# Patient Record
Sex: MALE | Race: WHITE | NOT HISPANIC OR LATINO | Employment: OTHER
[De-identification: names, ages, dates, MRNs, and addresses within clinical notes are randomized per-mention and may not be internally consistent; named-entity substitution may affect disease eponyms.]

---

## 2017-06-23 ENCOUNTER — RECORDS - HEALTHEAST (OUTPATIENT)
Dept: ADMINISTRATIVE | Facility: OTHER | Age: 67
End: 2017-06-23

## 2017-10-11 ENCOUNTER — RECORDS - HEALTHEAST (OUTPATIENT)
Dept: ADMINISTRATIVE | Facility: OTHER | Age: 67
End: 2017-10-11

## 2017-10-27 ENCOUNTER — RECORDS - HEALTHEAST (OUTPATIENT)
Dept: ADMINISTRATIVE | Facility: OTHER | Age: 67
End: 2017-10-27

## 2018-07-10 ENCOUNTER — OFFICE VISIT - HEALTHEAST (OUTPATIENT)
Dept: FAMILY MEDICINE | Facility: CLINIC | Age: 68
End: 2018-07-10

## 2018-07-10 DIAGNOSIS — G47.50 PARASOMNIA, UNSPECIFIED: ICD-10-CM

## 2018-07-10 DIAGNOSIS — Z13.21 SCREENING FOR ENDOCRINE, NUTRITIONAL, METABOLIC AND IMMUNITY DISORDER: ICD-10-CM

## 2018-07-10 DIAGNOSIS — R68.82 LOW LIBIDO: ICD-10-CM

## 2018-07-10 DIAGNOSIS — H35.30 MACULAR DEGENERATION (SENILE) OF RETINA: ICD-10-CM

## 2018-07-10 DIAGNOSIS — Z13.29 SCREENING FOR ENDOCRINE, NUTRITIONAL, METABOLIC AND IMMUNITY DISORDER: ICD-10-CM

## 2018-07-10 DIAGNOSIS — Z13.0 SCREENING FOR ENDOCRINE, NUTRITIONAL, METABOLIC AND IMMUNITY DISORDER: ICD-10-CM

## 2018-07-10 DIAGNOSIS — Z13.228 SCREENING FOR ENDOCRINE, NUTRITIONAL, METABOLIC AND IMMUNITY DISORDER: ICD-10-CM

## 2018-07-10 DIAGNOSIS — Z00.01 ENCOUNTER FOR GENERAL ADULT MEDICAL EXAMINATION WITH ABNORMAL FINDINGS: ICD-10-CM

## 2018-07-10 DIAGNOSIS — Z12.5 SCREENING FOR PROSTATE CANCER: ICD-10-CM

## 2018-07-10 LAB
ALBUMIN SERPL-MCNC: 3.9 G/DL (ref 3.5–5)
ALP SERPL-CCNC: 60 U/L (ref 45–120)
ALT SERPL W P-5'-P-CCNC: 14 U/L (ref 0–45)
ANION GAP SERPL CALCULATED.3IONS-SCNC: 9 MMOL/L (ref 5–18)
AST SERPL W P-5'-P-CCNC: 20 U/L (ref 0–40)
BILIRUB SERPL-MCNC: 0.8 MG/DL (ref 0–1)
BUN SERPL-MCNC: 17 MG/DL (ref 8–22)
CALCIUM SERPL-MCNC: 9.3 MG/DL (ref 8.5–10.5)
CHLORIDE BLD-SCNC: 108 MMOL/L (ref 98–107)
CHOLEST SERPL-MCNC: 214 MG/DL
CO2 SERPL-SCNC: 26 MMOL/L (ref 22–31)
CREAT SERPL-MCNC: 0.81 MG/DL (ref 0.7–1.3)
ERYTHROCYTE [DISTWIDTH] IN BLOOD BY AUTOMATED COUNT: 12 % (ref 11–14.5)
FASTING STATUS PATIENT QL REPORTED: YES
GFR SERPL CREATININE-BSD FRML MDRD: >60 ML/MIN/1.73M2
GLUCOSE BLD-MCNC: 103 MG/DL (ref 70–125)
HCT VFR BLD AUTO: 44.3 % (ref 40–54)
HDLC SERPL-MCNC: 62 MG/DL
HGB BLD-MCNC: 15.4 G/DL (ref 14–18)
LDLC SERPL CALC-MCNC: 134 MG/DL
LH SERPL-ACNC: 3 MIU/ML
MCH RBC QN AUTO: 32.8 PG (ref 27–34)
MCHC RBC AUTO-ENTMCNC: 34.8 G/DL (ref 32–36)
MCV RBC AUTO: 94 FL (ref 80–100)
PLATELET # BLD AUTO: 157 THOU/UL (ref 140–440)
PMV BLD AUTO: 10.5 FL (ref 8.5–12.5)
POTASSIUM BLD-SCNC: 4.6 MMOL/L (ref 3.5–5)
PROT SERPL-MCNC: 6.6 G/DL (ref 6–8)
PSA SERPL-MCNC: 3.6 NG/ML (ref 0–4.5)
RBC # BLD AUTO: 4.7 MILL/UL (ref 4.4–6.2)
SODIUM SERPL-SCNC: 143 MMOL/L (ref 136–145)
TRIGL SERPL-MCNC: 88 MG/DL
TSH SERPL DL<=0.005 MIU/L-ACNC: 1.19 UIU/ML (ref 0.3–5)
WBC: 5.1 THOU/UL (ref 4–11)

## 2018-07-10 RX ORDER — CLONAZEPAM 2 MG/1
2 TABLET ORAL AT BEDTIME
Status: SHIPPED | COMMUNITY
Start: 2018-05-31

## 2018-07-10 ASSESSMENT — MIFFLIN-ST. JEOR: SCORE: 1422.37

## 2018-07-12 ENCOUNTER — COMMUNICATION - HEALTHEAST (OUTPATIENT)
Dept: FAMILY MEDICINE | Facility: CLINIC | Age: 68
End: 2018-07-12

## 2018-07-13 LAB
SHBG SERPL-SCNC: 42 NMOL/L (ref 11–80)
TESTOST FREE SERPL-MCNC: 8.86 NG/DL (ref 4.7–24.4)
TESTOST SERPL-MCNC: 493 NG/DL (ref 240–950)

## 2018-09-10 ENCOUNTER — RECORDS - HEALTHEAST (OUTPATIENT)
Dept: ADMINISTRATIVE | Facility: OTHER | Age: 68
End: 2018-09-10

## 2018-12-28 ENCOUNTER — COMMUNICATION - HEALTHEAST (OUTPATIENT)
Dept: FAMILY MEDICINE | Facility: CLINIC | Age: 68
End: 2018-12-28

## 2018-12-28 DIAGNOSIS — R06.02 SOB (SHORTNESS OF BREATH): ICD-10-CM

## 2019-01-09 ENCOUNTER — AMBULATORY - HEALTHEAST (OUTPATIENT)
Dept: PULMONOLOGY | Facility: OTHER | Age: 69
End: 2019-01-09

## 2019-01-09 DIAGNOSIS — R06.02 SOB (SHORTNESS OF BREATH): ICD-10-CM

## 2019-01-11 ENCOUNTER — COMMUNICATION - HEALTHEAST (OUTPATIENT)
Dept: PULMONOLOGY | Facility: OTHER | Age: 69
End: 2019-01-11

## 2019-01-29 ENCOUNTER — RECORDS - HEALTHEAST (OUTPATIENT)
Dept: PULMONOLOGY | Facility: OTHER | Age: 69
End: 2019-01-29

## 2019-01-29 ENCOUNTER — RECORDS - HEALTHEAST (OUTPATIENT)
Dept: ADMINISTRATIVE | Facility: OTHER | Age: 69
End: 2019-01-29

## 2019-01-29 DIAGNOSIS — R06.02 SHORTNESS OF BREATH: ICD-10-CM

## 2019-02-05 ENCOUNTER — OFFICE VISIT - HEALTHEAST (OUTPATIENT)
Dept: PULMONOLOGY | Facility: OTHER | Age: 69
End: 2019-02-05

## 2019-02-05 DIAGNOSIS — J44.9 CHRONIC OBSTRUCTIVE PULMONARY DISEASE, UNSPECIFIED COPD TYPE (H): ICD-10-CM

## 2019-02-05 DIAGNOSIS — K21.9 GASTROESOPHAGEAL REFLUX DISEASE, ESOPHAGITIS PRESENCE NOT SPECIFIED: ICD-10-CM

## 2019-02-05 DIAGNOSIS — R05.9 COUGH: ICD-10-CM

## 2019-02-05 DIAGNOSIS — Z87.891 PERSONAL HISTORY OF TOBACCO USE, PRESENTING HAZARDS TO HEALTH: ICD-10-CM

## 2019-02-05 ASSESSMENT — MIFFLIN-ST. JEOR: SCORE: 1422.37

## 2019-04-16 ENCOUNTER — OFFICE VISIT - HEALTHEAST (OUTPATIENT)
Dept: PULMONOLOGY | Facility: OTHER | Age: 69
End: 2019-04-16

## 2019-04-16 ENCOUNTER — HOSPITAL ENCOUNTER (OUTPATIENT)
Dept: CT IMAGING | Facility: HOSPITAL | Age: 69
Discharge: HOME | End: 2019-04-16
Attending: INTERNAL MEDICINE

## 2019-04-16 DIAGNOSIS — J47.9 BRONCHIECTASIS WITHOUT ACUTE EXACERBATION (H): ICD-10-CM

## 2019-04-16 DIAGNOSIS — Z87.891 PERSONAL HISTORY OF TOBACCO USE, PRESENTING HAZARDS TO HEALTH: ICD-10-CM

## 2019-04-16 LAB
IGA SERPL-MCNC: 203 MG/DL (ref 65–400)
IGA SERPL-MCNC: 867 MG/DL
IGM SERPL-MCNC: 186 MG/DL (ref 60–280)
RHEUMATOID FACT SERPL-ACNC: <15 IU/ML (ref 0–30)

## 2019-04-16 ASSESSMENT — MIFFLIN-ST. JEOR: SCORE: 1445.05

## 2019-04-18 ENCOUNTER — AMBULATORY - HEALTHEAST (OUTPATIENT)
Dept: PULMONOLOGY | Facility: OTHER | Age: 69
End: 2019-04-18

## 2019-04-19 LAB
IGG SERPL-MCNC: 875 MG/DL (ref 695–1620)
IGG1 SER-MCNC: 488 MG/DL (ref 300–856)
IGG2 SER-MCNC: 269 MG/DL (ref 158–761)
IGG3 SER-MCNC: 28 MG/DL (ref 24–192)
IGG4 SER-MCNC: 43 MG/DL (ref 11–86)

## 2019-05-07 ENCOUNTER — COMMUNICATION - HEALTHEAST (OUTPATIENT)
Dept: PULMONOLOGY | Facility: OTHER | Age: 69
End: 2019-05-07

## 2019-05-07 DIAGNOSIS — K21.9 GASTROESOPHAGEAL REFLUX DISEASE, ESOPHAGITIS PRESENCE NOT SPECIFIED: ICD-10-CM

## 2019-05-07 DIAGNOSIS — R05.9 COUGH: ICD-10-CM

## 2019-07-14 ENCOUNTER — COMMUNICATION - HEALTHEAST (OUTPATIENT)
Dept: FAMILY MEDICINE | Facility: CLINIC | Age: 69
End: 2019-07-14

## 2019-07-14 DIAGNOSIS — R06.02 SOB (SHORTNESS OF BREATH): ICD-10-CM

## 2019-08-06 ENCOUNTER — OFFICE VISIT - HEALTHEAST (OUTPATIENT)
Dept: FAMILY MEDICINE | Facility: CLINIC | Age: 69
End: 2019-08-06

## 2019-08-06 DIAGNOSIS — N52.01 ERECTILE DYSFUNCTION DUE TO ARTERIAL INSUFFICIENCY: ICD-10-CM

## 2019-08-06 DIAGNOSIS — Z00.01 ENCOUNTER FOR GENERAL ADULT MEDICAL EXAMINATION WITH ABNORMAL FINDINGS: ICD-10-CM

## 2019-08-06 DIAGNOSIS — Z13.29 SCREENING FOR ENDOCRINE, NUTRITIONAL, METABOLIC AND IMMUNITY DISORDER: ICD-10-CM

## 2019-08-06 DIAGNOSIS — R06.02 SOB (SHORTNESS OF BREATH): ICD-10-CM

## 2019-08-06 DIAGNOSIS — Z12.11 SCREEN FOR COLON CANCER: ICD-10-CM

## 2019-08-06 DIAGNOSIS — Z13.21 SCREENING FOR ENDOCRINE, NUTRITIONAL, METABOLIC AND IMMUNITY DISORDER: ICD-10-CM

## 2019-08-06 DIAGNOSIS — Z12.5 SCREENING FOR PROSTATE CANCER: ICD-10-CM

## 2019-08-06 DIAGNOSIS — R68.82 LOW LIBIDO: ICD-10-CM

## 2019-08-06 DIAGNOSIS — Z13.228 SCREENING FOR ENDOCRINE, NUTRITIONAL, METABOLIC AND IMMUNITY DISORDER: ICD-10-CM

## 2019-08-06 DIAGNOSIS — Z13.0 SCREENING FOR ENDOCRINE, NUTRITIONAL, METABOLIC AND IMMUNITY DISORDER: ICD-10-CM

## 2019-08-06 DIAGNOSIS — J44.9 CHRONIC OBSTRUCTIVE PULMONARY DISEASE, UNSPECIFIED COPD TYPE (H): ICD-10-CM

## 2019-08-06 LAB
ALBUMIN SERPL-MCNC: 4.3 G/DL (ref 3.5–5)
ALP SERPL-CCNC: 66 U/L (ref 45–120)
ALT SERPL W P-5'-P-CCNC: 17 U/L (ref 0–45)
ANION GAP SERPL CALCULATED.3IONS-SCNC: 9 MMOL/L (ref 5–18)
AST SERPL W P-5'-P-CCNC: 22 U/L (ref 0–40)
BILIRUB SERPL-MCNC: 0.5 MG/DL (ref 0–1)
BUN SERPL-MCNC: 19 MG/DL (ref 8–22)
CALCIUM SERPL-MCNC: 9.8 MG/DL (ref 8.5–10.5)
CHLORIDE BLD-SCNC: 104 MMOL/L (ref 98–107)
CHOLEST SERPL-MCNC: 209 MG/DL
CO2 SERPL-SCNC: 27 MMOL/L (ref 22–31)
CREAT SERPL-MCNC: 1.01 MG/DL (ref 0.7–1.3)
FASTING STATUS PATIENT QL REPORTED: NO
GFR SERPL CREATININE-BSD FRML MDRD: >60 ML/MIN/1.73M2
GLUCOSE BLD-MCNC: 100 MG/DL (ref 70–125)
HDLC SERPL-MCNC: 65 MG/DL
LDLC SERPL CALC-MCNC: 124 MG/DL
POTASSIUM BLD-SCNC: 5.2 MMOL/L (ref 3.5–5)
PROT SERPL-MCNC: 7.1 G/DL (ref 6–8)
PSA SERPL-MCNC: 3.5 NG/ML (ref 0–4.5)
SODIUM SERPL-SCNC: 140 MMOL/L (ref 136–145)
TRIGL SERPL-MCNC: 101 MG/DL
TSH SERPL DL<=0.005 MIU/L-ACNC: 2.03 UIU/ML (ref 0.3–5)

## 2019-08-06 RX ORDER — SILDENAFIL CITRATE 20 MG/1
TABLET ORAL
Qty: 30 TABLET | Refills: 3 | Status: SHIPPED | OUTPATIENT
Start: 2019-08-06

## 2019-08-06 ASSESSMENT — MIFFLIN-ST. JEOR: SCORE: 1392.89

## 2019-08-07 ENCOUNTER — COMMUNICATION - HEALTHEAST (OUTPATIENT)
Dept: FAMILY MEDICINE | Facility: CLINIC | Age: 69
End: 2019-08-07

## 2019-10-15 ENCOUNTER — COMMUNICATION - HEALTHEAST (OUTPATIENT)
Dept: FAMILY MEDICINE | Facility: CLINIC | Age: 69
End: 2019-10-15

## 2019-10-15 DIAGNOSIS — R06.02 SOB (SHORTNESS OF BREATH): ICD-10-CM

## 2019-10-16 ENCOUNTER — COMMUNICATION - HEALTHEAST (OUTPATIENT)
Dept: SCHEDULING | Facility: CLINIC | Age: 69
End: 2019-10-16

## 2019-10-16 ENCOUNTER — COMMUNICATION - HEALTHEAST (OUTPATIENT)
Dept: FAMILY MEDICINE | Facility: CLINIC | Age: 69
End: 2019-10-16

## 2019-10-16 DIAGNOSIS — R06.02 SOB (SHORTNESS OF BREATH): ICD-10-CM

## 2019-10-24 ENCOUNTER — COMMUNICATION - HEALTHEAST (OUTPATIENT)
Dept: FAMILY MEDICINE | Facility: CLINIC | Age: 69
End: 2019-10-24

## 2019-10-24 DIAGNOSIS — R06.02 SOB (SHORTNESS OF BREATH): ICD-10-CM

## 2019-10-25 ENCOUNTER — AMBULATORY - HEALTHEAST (OUTPATIENT)
Dept: PULMONOLOGY | Facility: OTHER | Age: 69
End: 2019-10-25

## 2019-10-25 DIAGNOSIS — K21.9 GERD (GASTROESOPHAGEAL REFLUX DISEASE): ICD-10-CM

## 2020-03-03 ENCOUNTER — COMMUNICATION - HEALTHEAST (OUTPATIENT)
Dept: FAMILY MEDICINE | Facility: CLINIC | Age: 70
End: 2020-03-03

## 2020-03-03 DIAGNOSIS — J44.9 CHRONIC OBSTRUCTIVE PULMONARY DISEASE, UNSPECIFIED COPD TYPE (H): ICD-10-CM

## 2020-04-15 ENCOUNTER — COMMUNICATION - HEALTHEAST (OUTPATIENT)
Dept: PULMONOLOGY | Facility: OTHER | Age: 70
End: 2020-04-15

## 2020-04-20 ENCOUNTER — RECORDS - HEALTHEAST (OUTPATIENT)
Dept: ADMINISTRATIVE | Facility: OTHER | Age: 70
End: 2020-04-20

## 2020-04-20 LAB — COLOGUARD-ABSTRACT: NEGATIVE

## 2020-05-06 ENCOUNTER — COMMUNICATION - HEALTHEAST (OUTPATIENT)
Dept: FAMILY MEDICINE | Facility: CLINIC | Age: 70
End: 2020-05-06

## 2020-05-12 ENCOUNTER — RECORDS - HEALTHEAST (OUTPATIENT)
Dept: HEALTH INFORMATION MANAGEMENT | Facility: CLINIC | Age: 70
End: 2020-05-12

## 2020-07-15 ENCOUNTER — COMMUNICATION - HEALTHEAST (OUTPATIENT)
Dept: PULMONOLOGY | Facility: OTHER | Age: 70
End: 2020-07-15

## 2020-09-09 ENCOUNTER — COMMUNICATION - HEALTHEAST (OUTPATIENT)
Dept: PULMONOLOGY | Facility: OTHER | Age: 70
End: 2020-09-09

## 2020-09-20 ENCOUNTER — COMMUNICATION - HEALTHEAST (OUTPATIENT)
Dept: PULMONOLOGY | Facility: OTHER | Age: 70
End: 2020-09-20

## 2020-09-20 DIAGNOSIS — K21.9 GERD (GASTROESOPHAGEAL REFLUX DISEASE): ICD-10-CM

## 2020-10-19 ENCOUNTER — COMMUNICATION - HEALTHEAST (OUTPATIENT)
Dept: FAMILY MEDICINE | Facility: CLINIC | Age: 70
End: 2020-10-19

## 2020-10-19 DIAGNOSIS — R06.02 SOB (SHORTNESS OF BREATH): ICD-10-CM

## 2020-11-05 ENCOUNTER — OFFICE VISIT - HEALTHEAST (OUTPATIENT)
Dept: FAMILY MEDICINE | Facility: CLINIC | Age: 70
End: 2020-11-05

## 2020-11-05 DIAGNOSIS — E78.5 HYPERLIPIDEMIA LDL GOAL <70: ICD-10-CM

## 2020-11-05 DIAGNOSIS — Z12.5 SCREENING PSA (PROSTATE SPECIFIC ANTIGEN): ICD-10-CM

## 2020-11-05 DIAGNOSIS — Z00.01 ENCOUNTER FOR GENERAL ADULT MEDICAL EXAMINATION WITH ABNORMAL FINDINGS: ICD-10-CM

## 2020-11-05 DIAGNOSIS — I25.10 CORONARY ARTERY CALCIFICATION: ICD-10-CM

## 2020-11-05 DIAGNOSIS — Z13.0 SCREENING FOR ENDOCRINE, NUTRITIONAL, METABOLIC AND IMMUNITY DISORDER: ICD-10-CM

## 2020-11-05 DIAGNOSIS — Z13.21 SCREENING FOR ENDOCRINE, NUTRITIONAL, METABOLIC AND IMMUNITY DISORDER: ICD-10-CM

## 2020-11-05 DIAGNOSIS — Z13.29 SCREENING FOR ENDOCRINE, NUTRITIONAL, METABOLIC AND IMMUNITY DISORDER: ICD-10-CM

## 2020-11-05 DIAGNOSIS — J44.9 CHRONIC OBSTRUCTIVE PULMONARY DISEASE, UNSPECIFIED COPD TYPE (H): ICD-10-CM

## 2020-11-05 DIAGNOSIS — R93.1 ABNORMAL FINDINGS DIAGNOSTIC IMAGING OF HEART AND CORONARY CIRCULATION: ICD-10-CM

## 2020-11-05 DIAGNOSIS — K21.9 GERD (GASTROESOPHAGEAL REFLUX DISEASE): ICD-10-CM

## 2020-11-05 DIAGNOSIS — Z11.59 ENCOUNTER FOR HCV SCREENING TEST FOR LOW RISK PATIENT: ICD-10-CM

## 2020-11-05 DIAGNOSIS — Z13.228 SCREENING FOR ENDOCRINE, NUTRITIONAL, METABOLIC AND IMMUNITY DISORDER: ICD-10-CM

## 2020-11-05 DIAGNOSIS — Z87.891 H/O NICOTINE DEPENDENCE: ICD-10-CM

## 2020-11-05 LAB
ALBUMIN SERPL-MCNC: 4 G/DL (ref 3.5–5)
ALP SERPL-CCNC: 89 U/L (ref 45–120)
ALT SERPL W P-5'-P-CCNC: 15 U/L (ref 0–45)
ANION GAP SERPL CALCULATED.3IONS-SCNC: 10 MMOL/L (ref 5–18)
AST SERPL W P-5'-P-CCNC: 20 U/L (ref 0–40)
BILIRUB SERPL-MCNC: 0.4 MG/DL (ref 0–1)
BUN SERPL-MCNC: 21 MG/DL (ref 8–28)
CALCIUM SERPL-MCNC: 9.6 MG/DL (ref 8.5–10.5)
CHLORIDE BLD-SCNC: 105 MMOL/L (ref 98–107)
CHOLEST SERPL-MCNC: 202 MG/DL
CO2 SERPL-SCNC: 26 MMOL/L (ref 22–31)
CREAT SERPL-MCNC: 0.94 MG/DL (ref 0.7–1.3)
ERYTHROCYTE [DISTWIDTH] IN BLOOD BY AUTOMATED COUNT: 12.2 % (ref 11–14.5)
FASTING STATUS PATIENT QL REPORTED: NO
GFR SERPL CREATININE-BSD FRML MDRD: >60 ML/MIN/1.73M2
GLUCOSE BLD-MCNC: 110 MG/DL (ref 70–125)
HCT VFR BLD AUTO: 44.6 % (ref 40–54)
HCV AB SERPL QL IA: NEGATIVE
HDLC SERPL-MCNC: 57 MG/DL
HGB BLD-MCNC: 14.9 G/DL (ref 14–18)
LDLC SERPL CALC-MCNC: 126 MG/DL
MCH RBC QN AUTO: 31.2 PG (ref 27–34)
MCHC RBC AUTO-ENTMCNC: 33.4 G/DL (ref 32–36)
MCV RBC AUTO: 93 FL (ref 80–100)
PLATELET # BLD AUTO: 215 THOU/UL (ref 140–440)
PMV BLD AUTO: 8.1 FL (ref 7–10)
POTASSIUM BLD-SCNC: 5.3 MMOL/L (ref 3.5–5)
PROT SERPL-MCNC: 7 G/DL (ref 6–8)
PSA SERPL-MCNC: 3.5 NG/ML (ref 0–6.5)
RBC # BLD AUTO: 4.78 MILL/UL (ref 4.4–6.2)
SODIUM SERPL-SCNC: 141 MMOL/L (ref 136–145)
TRIGL SERPL-MCNC: 96 MG/DL
TSH SERPL DL<=0.005 MIU/L-ACNC: 0.99 UIU/ML (ref 0.3–5)
WBC: 5.2 THOU/UL (ref 4–11)

## 2020-11-05 RX ORDER — ATORVASTATIN CALCIUM 10 MG/1
10 TABLET, FILM COATED ORAL DAILY
Qty: 90 TABLET | Refills: 3 | Status: SHIPPED | OUTPATIENT
Start: 2020-11-05

## 2020-11-05 RX ORDER — FAMOTIDINE 40 MG/1
40 TABLET, FILM COATED ORAL DAILY
Qty: 90 TABLET | Refills: 3 | Status: SHIPPED | OUTPATIENT
Start: 2020-11-05 | End: 2021-11-11

## 2020-11-05 ASSESSMENT — MIFFLIN-ST. JEOR: SCORE: 1394.03

## 2020-11-05 ASSESSMENT — ANXIETY QUESTIONNAIRES
1. FEELING NERVOUS, ANXIOUS, OR ON EDGE: NOT AT ALL
2. NOT BEING ABLE TO STOP OR CONTROL WORRYING: NOT AT ALL

## 2020-11-06 ENCOUNTER — COMMUNICATION - HEALTHEAST (OUTPATIENT)
Dept: FAMILY MEDICINE | Facility: CLINIC | Age: 70
End: 2020-11-06

## 2020-11-16 ENCOUNTER — COMMUNICATION - HEALTHEAST (OUTPATIENT)
Dept: FAMILY MEDICINE | Facility: CLINIC | Age: 70
End: 2020-11-16

## 2020-11-16 DIAGNOSIS — J44.9 CHRONIC OBSTRUCTIVE PULMONARY DISEASE, UNSPECIFIED COPD TYPE (H): ICD-10-CM

## 2020-11-29 ENCOUNTER — HOSPITAL ENCOUNTER (OUTPATIENT)
Dept: CT IMAGING | Facility: HOSPITAL | Age: 70
Discharge: HOME | End: 2020-11-29
Attending: FAMILY MEDICINE

## 2020-11-29 DIAGNOSIS — Z87.891 H/O NICOTINE DEPENDENCE: ICD-10-CM

## 2020-11-29 DIAGNOSIS — J44.9 CHRONIC OBSTRUCTIVE PULMONARY DISEASE, UNSPECIFIED COPD TYPE (H): ICD-10-CM

## 2020-12-01 ENCOUNTER — RECORDS - HEALTHEAST (OUTPATIENT)
Dept: ADMINISTRATIVE | Facility: OTHER | Age: 70
End: 2020-12-01

## 2020-12-01 ENCOUNTER — COMMUNICATION - HEALTHEAST (OUTPATIENT)
Dept: SCHEDULING | Facility: CLINIC | Age: 70
End: 2020-12-01

## 2020-12-01 DIAGNOSIS — M25.559 HIP PAIN: ICD-10-CM

## 2020-12-01 ASSESSMENT — MIFFLIN-ST. JEOR: SCORE: 1382.97

## 2020-12-02 ENCOUNTER — COMMUNICATION - HEALTHEAST (OUTPATIENT)
Dept: SCHEDULING | Facility: CLINIC | Age: 70
End: 2020-12-02

## 2020-12-02 ENCOUNTER — ANESTHESIA - HEALTHEAST (OUTPATIENT)
Dept: SURGERY | Facility: CLINIC | Age: 70
End: 2020-12-02

## 2020-12-02 ENCOUNTER — SURGERY - HEALTHEAST (OUTPATIENT)
Dept: SURGERY | Facility: CLINIC | Age: 70
End: 2020-12-02

## 2020-12-03 ENCOUNTER — COMMUNICATION - HEALTHEAST (OUTPATIENT)
Dept: FAMILY MEDICINE | Facility: CLINIC | Age: 70
End: 2020-12-03

## 2020-12-04 ENCOUNTER — HOME CARE/HOSPICE - HEALTHEAST (OUTPATIENT)
Dept: HOME HEALTH SERVICES | Facility: HOME HEALTH | Age: 70
End: 2020-12-04

## 2020-12-04 ASSESSMENT — MIFFLIN-ST. JEOR: SCORE: 1427.13

## 2020-12-06 ENCOUNTER — HOME CARE/HOSPICE - HEALTHEAST (OUTPATIENT)
Dept: HOME HEALTH SERVICES | Facility: HOME HEALTH | Age: 70
End: 2020-12-06

## 2020-12-06 ENCOUNTER — COMMUNICATION - HEALTHEAST (OUTPATIENT)
Dept: HOME HEALTH SERVICES | Facility: HOME HEALTH | Age: 70
End: 2020-12-06

## 2020-12-08 ENCOUNTER — HOME CARE/HOSPICE - HEALTHEAST (OUTPATIENT)
Dept: HOME HEALTH SERVICES | Facility: HOME HEALTH | Age: 70
End: 2020-12-08

## 2020-12-09 ENCOUNTER — COMMUNICATION - HEALTHEAST (OUTPATIENT)
Dept: HOME HEALTH SERVICES | Facility: HOME HEALTH | Age: 70
End: 2020-12-09

## 2020-12-09 ENCOUNTER — HOME CARE/HOSPICE - HEALTHEAST (OUTPATIENT)
Dept: HOME HEALTH SERVICES | Facility: HOME HEALTH | Age: 70
End: 2020-12-09

## 2020-12-09 ENCOUNTER — COMMUNICATION - HEALTHEAST (OUTPATIENT)
Dept: FAMILY MEDICINE | Facility: CLINIC | Age: 70
End: 2020-12-09

## 2020-12-10 ENCOUNTER — HOME CARE/HOSPICE - HEALTHEAST (OUTPATIENT)
Dept: HOME HEALTH SERVICES | Facility: HOME HEALTH | Age: 70
End: 2020-12-10

## 2020-12-10 ENCOUNTER — COMMUNICATION - HEALTHEAST (OUTPATIENT)
Dept: TELEHEALTH | Facility: CLINIC | Age: 70
End: 2020-12-10

## 2020-12-10 ENCOUNTER — OFFICE VISIT - HEALTHEAST (OUTPATIENT)
Dept: FAMILY MEDICINE | Facility: CLINIC | Age: 70
End: 2020-12-10

## 2020-12-10 ENCOUNTER — COMMUNICATION - HEALTHEAST (OUTPATIENT)
Dept: FAMILY MEDICINE | Facility: CLINIC | Age: 70
End: 2020-12-10

## 2020-12-10 DIAGNOSIS — R41.3 EPISODIC MEMORY LOSS: ICD-10-CM

## 2020-12-10 DIAGNOSIS — S72.001A CLOSED FRACTURE OF NECK OF RIGHT FEMUR, INITIAL ENCOUNTER (H): ICD-10-CM

## 2020-12-10 DIAGNOSIS — Z09 HOSPITAL DISCHARGE FOLLOW-UP: ICD-10-CM

## 2020-12-10 DIAGNOSIS — J44.9 CHRONIC OBSTRUCTIVE PULMONARY DISEASE, UNSPECIFIED COPD TYPE (H): ICD-10-CM

## 2020-12-11 ENCOUNTER — HOME CARE/HOSPICE - HEALTHEAST (OUTPATIENT)
Dept: HOME HEALTH SERVICES | Facility: HOME HEALTH | Age: 70
End: 2020-12-11

## 2020-12-14 ENCOUNTER — HOME CARE/HOSPICE - HEALTHEAST (OUTPATIENT)
Dept: HOME HEALTH SERVICES | Facility: HOME HEALTH | Age: 70
End: 2020-12-14

## 2020-12-16 ENCOUNTER — HOME CARE/HOSPICE - HEALTHEAST (OUTPATIENT)
Dept: HOME HEALTH SERVICES | Facility: HOME HEALTH | Age: 70
End: 2020-12-16

## 2020-12-16 ENCOUNTER — COMMUNICATION - HEALTHEAST (OUTPATIENT)
Dept: FAMILY MEDICINE | Facility: CLINIC | Age: 70
End: 2020-12-16

## 2020-12-17 ENCOUNTER — HOME CARE/HOSPICE - HEALTHEAST (OUTPATIENT)
Dept: HOME HEALTH SERVICES | Facility: HOME HEALTH | Age: 70
End: 2020-12-17

## 2020-12-21 ENCOUNTER — HOME CARE/HOSPICE - HEALTHEAST (OUTPATIENT)
Dept: HOME HEALTH SERVICES | Facility: HOME HEALTH | Age: 70
End: 2020-12-21

## 2020-12-23 ENCOUNTER — HOME CARE/HOSPICE - HEALTHEAST (OUTPATIENT)
Dept: HOME HEALTH SERVICES | Facility: HOME HEALTH | Age: 70
End: 2020-12-23

## 2021-03-16 ENCOUNTER — COMMUNICATION - HEALTHEAST (OUTPATIENT)
Dept: FAMILY MEDICINE | Facility: CLINIC | Age: 71
End: 2021-03-16

## 2021-03-16 DIAGNOSIS — K21.9 GERD (GASTROESOPHAGEAL REFLUX DISEASE): ICD-10-CM

## 2021-03-19 ENCOUNTER — RECORDS - HEALTHEAST (OUTPATIENT)
Dept: ADMINISTRATIVE | Facility: OTHER | Age: 71
End: 2021-03-19

## 2021-04-20 ENCOUNTER — COMMUNICATION - HEALTHEAST (OUTPATIENT)
Dept: FAMILY MEDICINE | Facility: CLINIC | Age: 71
End: 2021-04-20

## 2021-04-20 ENCOUNTER — RECORDS - HEALTHEAST (OUTPATIENT)
Dept: ADMINISTRATIVE | Facility: OTHER | Age: 71
End: 2021-04-20

## 2021-04-20 DIAGNOSIS — J44.9 CHRONIC OBSTRUCTIVE PULMONARY DISEASE, UNSPECIFIED COPD TYPE (H): ICD-10-CM

## 2021-04-21 RX ORDER — BUDESONIDE AND FORMOTEROL FUMARATE DIHYDRATE 80; 4.5 UG/1; UG/1
2 AEROSOL RESPIRATORY (INHALATION) 2 TIMES DAILY
Qty: 1 INHALER | Refills: 5 | Status: SHIPPED | OUTPATIENT
Start: 2021-04-21 | End: 2023-01-05

## 2021-05-26 VITALS
OXYGEN SATURATION: 94 % | TEMPERATURE: 98.2 F | SYSTOLIC BLOOD PRESSURE: 148 MMHG | DIASTOLIC BLOOD PRESSURE: 56 MMHG | HEART RATE: 83 BPM

## 2021-05-26 VITALS — HEART RATE: 84 BPM | OXYGEN SATURATION: 97 %

## 2021-05-27 VITALS — HEART RATE: 81 BPM | OXYGEN SATURATION: 98 %

## 2021-05-27 VITALS
TEMPERATURE: 100.5 F | DIASTOLIC BLOOD PRESSURE: 83 MMHG | HEART RATE: 88 BPM | OXYGEN SATURATION: 94 % | SYSTOLIC BLOOD PRESSURE: 140 MMHG

## 2021-05-27 VITALS — HEART RATE: 79 BPM | OXYGEN SATURATION: 98 %

## 2021-05-27 NOTE — PATIENT INSTRUCTIONS - HE
Only use albuterol if & when you feel shortness of breath     Can try stopping the Symbicort, but resume it two times a day if shortness of breath recurs    Lab tests today for bronchiectasis workup    If cough recurs, collect in specimen cup and send for culture    Elevate back, continue antacid    Return to clinic in April 2020 for lung cancer screening, or sooner if new questions, symptoms, or concerning test results arise

## 2021-05-27 NOTE — PROGRESS NOTES
"We got a call today from the lab saying that Bill's hemogram clotted and will need to be redrawn.  I sent a message to Dr. Jason asking if he wanted him to come back in to get redrawn right away? Or can he wait until his next apt with you or his PCP? Dr. Gray responded saying \"He really wants to avoid coming back more than he has to, and it's unlikely to find a cause of his bronchiectasis, so I think we can just wait until he next clinic visit next year\"  "

## 2021-05-27 NOTE — PROGRESS NOTES
Assessment and Plan:You Montano is a 68 y.o. M with a past medical history significant for a 40-pack-year smoking history, having quit 5-10 years ago, who presented to clinic in Feb 2019 for shortness of breath and a cough productive for whitish sputum primarily in the mornings.  He reported occasional heartburn and sleeps flat at night.  He has used a CPAP from [facility] for sleep apnea for the past 15 years and recently had it checked and adjusted.  Until coming to our clinic he had never previously had pulmonary function testing or been formally diagnosed with COPD.  He had been recently started a on a as needed albuterol inhaler with a spacer that he uses on average 1-2 times per week and experienced benefit from it.     1/29/2019 PFTs: Consistent with moderate obstruction, FEV1 40% of predicted.  Diffusing capacity was decreased at 47% of predicted.  RV was 172% of predicted showing air trapping    4/16/2019 low-dose noncontrast chest CT: No nodules concerning for malignancy.  Mild cylindrical bronchiectasis in the lower lobes with slight atelectasis was seen.    On 4/16/2019 follow-up visit, the patient states that his morning cough and shortness of breath have completely resolved since starting to take the antacid.  He tried a wedge pillow felt comfortable with that, and was not able to elevate his head of bed with blocks due to the design that would cause it to break the bed.  He is no longer having shortness of breath.  He is using Symbicort along with albuterol twice daily whether he feels short of breath or not.  He only occasionally needs to use the albuterol at other times on top of that.     1) Nocturnal reflux aspiration (likely causing morning cough and shortness of breath), continue:  - Zantac nightly  - avoid food or drink within 3 hours of bedtime  - avoid trigger foods (alcohol, caffeine, spicy foods)  - Patient states he will try to elevate his back by placing a wedge pillow under the  mattress     2) Moderate COPD: Shortness of breath resolved since last visit when we started him on Symbicort, however he is also using the albuterol twice a day whether he feels short of breath or not.  He is questioning whether he even needs his Symbicort now.  - Patient advised to use albuterol as a rescue inhaler only when he feels short of breath  - May try stopping Symbicort, however resume if shortness of breath episodes increase significantly  - Patient does not feel the need for pulmonary rehab as he is able to exercise by walking a mile and a half every other day without difficulty     3) bronchiectasis-new diagnosis per CT scan, images reviewed with the patient: Likely due to chronic reflux aspiration given the bibasilar gravity dependent location  - We will send connective tissue disease workup labs today  - Patient not currently having a cough, however was provided with a sterile specimen cup to submit a sputum culture if his cough recurs  - No current symptoms of shortness of breath, congestion, or cough so will hold off on flutter valve therapy at this time.  May consider if symptoms worsen.    4) next lung cancer screening CT due April 2020     Patient wishes to wait until his next lung cancer screening CT is due for clinic follow-up, unless new or worsening symptoms are concerning lab results appear in the interim        CCx: Lung cancer screening, COPD, dyspnea    HPI: Mr Montano states that his morning cough and shortness of breath have completely resolved since starting to take the antacid.  He tried a wedge pillow felt comfortable with that, and was not able to elevate his head of bed with blocks due to the design that would cause it to break the bed.  He is no longer having shortness of breath.  He is using Symbicort along with albuterol twice daily whether he feels short of breath or not.  He only occasionally needs to use the albuterol at other times on top of that.    ROS:  A review of 12  organ systems was performed with pertinent positives and negatives noted in the HPI.      Current Meds:  Current Outpatient Medications   Medication Sig Note     albuterol (PROAIR HFA;PROVENTIL HFA;VENTOLIN HFA) 90 mcg/actuation inhaler Inhale 2 puffs every 6 (six) hours as needed for wheezing.      budesonide-formoterol (SYMBICORT) 80-4.5 mcg/actuation inhaler Inhale 2 puffs 2 (two) times a day.      clonazePAM (KLONOPIN) 2 MG tablet Take 2 mg by mouth. 7/10/2018: [email or web address] Sig: Take 1 tablet by mouth at bedtime.     ranitidine (ZANTAC) 150 MG tablet Take 1 tablet (150 mg total) by mouth at bedtime.      vitamins  A,C,E-zinc-copper 14,320-226-200 unit-mg-unit cap 2 caps daily        Labs:  No results found for this or any previous visit (from the past 72 hour(s)).    I have personally reviewed all pertinent imaging studies and PFT results unless otherwise noted.    Imaging studies:  Ct Low Dose Lung Screening Chest    Result Date: 4/16/2019  EXAM: LOW DOSE LUNG CANCER SCREENING CT CHEST LOCATION: [facility] DATE/TIME: 4/16/2019 10:45 AM INDICATION: Lung cancer screening. High risk patient with greater than 30 pack year smoking history. COMPARISON: None. TECHNIQUE: Low-dose lung cancer screening noncontrast CT chest. Dose reduction techniques were used. FINDINGS: LUNGS AND PLEURA: Negative screening CT chest. Mild cylindrical bronchiectasis in the lower lobes and some minimal atelectasis in the lower lobes posteriorly. Lungs otherwise clear. No infiltrates. No mucous plugging. A few benign calcified granulomas in the both lungs with some benign calcified right hilar lymph nodes. MEDIASTINUM: Negative. No lymphadenopathy. CORONARY ARTERY CALCIFICATION: Severe. LIMITED UPPER ABDOMEN: Benign-appearing liver lesion left lobe likely cyst. MUSCULOSKELETAL: Negative.     CONCLUSION: 1.  Negative screening CT chest. 2.  Mild cylindrical bronchiectasis in  "the lower lobes with slight atelectasis. 3.  Benign calcified granulomas. 4.  Severe coronary artery calcifications. RADIOLOGIST RECOMMENDATION: Recommend annual low-dose lung cancer screening CT if clinically appropriate. LungRADS CATEGORY: 2S: Benign. SIGNIFICANT INCIDENTAL FINDINGS: Positive. Severe coronary artery calcifications.        Physical Exam:  /82   Pulse 70   Resp 12   Ht 5' 6.5\" (1.689 m)   Wt 163 lb (73.9 kg)   SpO2 96%   BMI 25.91 kg/m    General - Well nourished  Ears/Mouth -  OP pink moist, no thrush  Neck - no cervical lymphadenopathy  Lungs - Clear to ausculation bilaterally, no crackles or wheezes  CVS - regular rhythm with no murmurs, rubs or gallups  Abdomen - soft, NT, ND, NABS  Ext - no cyanosis, clubbing or edema  Skin - no rash  Psychology - alert and oriented, answers appropriate        Electronically signed by:    Oliver Gray MD  [facility]  "

## 2021-05-30 NOTE — TELEPHONE ENCOUNTER
RN cannot approve Refill Request    RN can NOT refill this medication Protocol failed and NO refill given.       Allison Doty, [facility] [place] [facility] Triage/Med Refill 7/14/2019    Requested Prescriptions   Pending Prescriptions Disp Refills     VENTOLIN HFA 90 mcg/actuation inhaler [Pharmacy Med Name: VENTOLIN HFA INH W/DOS CTR 200PUFFS] 18 g 0     Sig: INHALE 2 PUFFS BY MOUTH EVERY 6 HOURS AS NEEDED FOR WHEEZING       Albuterol/Levalbuterol Refill Protocol Failed - 7/14/2019  1:32 PM        Failed - PCP or prescribing provider visit in last year     Last office visit with prescriber/PCP: Visit date not found OR same dept: Visit date not found OR same specialty: Visit date not found Last physical: 7/10/2018       Next appt within 3 mo: Visit date not found  Next physical within 3 mo: Visit date not found  Prescriber OR PCP: Vincenzo Malik MD  Last diagnosis associated with med order: There are no diagnoses linked to this encounter.  If protocol passes may refill for 6 months if within 3 months of last provider visit (or a total of 9 months). If patient requesting >1 inhaler per month refill x 6 months and have patient make appointment with provider.

## 2021-05-31 NOTE — PROGRESS NOTES
Assessment and Plan:     1. Encounter for general adult medical examination with abnormal findings      2. SOB (shortness of breath)    - albuterol (VENTOLIN HFA) 90 mcg/actuation inhaler; INHALE 2 PUFFS BY MOUTH EVERY 6 HOURS AS NEEDED FOR WHEEZING  Dispense: 18 g; Refill: 3    3. Chronic obstructive pulmonary disease, unspecified COPD type (H)    - budesonide-formoterol (SYMBICORT) 80-4.5 mcg/actuation inhaler; Inhale 2 puffs 2 (two) times a day.  Dispense: 1 Inhaler; Refill: 12    4. Screening for endocrine, nutritional, metabolic and immunity disorder    - Comprehensive Metabolic Panel  - Lipid Cascade  - Thyroid Cascade    5. Low libido    - sildenafil (REVATIO) 20 mg tablet; Take 2-3 tabs PO 1 hr before sex  Dispense: 30 tablet; Refill: 3    6. Erectile dysfunction due to arterial insufficiency    - sildenafil (REVATIO) 20 mg tablet; Take 2-3 tabs PO 1 hr before sex  Dispense: 30 tablet; Refill: 3    7. Screening for prostate cancer    - PSA (Prostatic-Specific Antigen), Annual Screen    8. Screen for colon cancer    - Cologuard     The patient's current medical problems were reviewed.  Patient continue to follow with ophthalmologist for macular degeneration treatment.  I have had an Advance Directives discussion with the patient.  The following high BMI interventions were performed this visit: encouragement to exercise  The following health maintenance schedule was reviewed with the patient and provided in printed form in the after visit summary:   Health Maintenance   Topic Date Due     HEPATITIS C SCREENING  1950     ZOSTER VACCINES (1 of 2) 08/27/1999     COLOGUARD  08/27/1999     INFLUENZA VACCINE RULE BASED (1) 08/01/2019     MEDICARE ANNUAL WELLNESS VISIT  08/06/2020     FALL RISK ASSESSMENT  08/06/2020     TD 18+ HE  02/28/2022     ADVANCE CARE PLANNING  08/06/2024     PNEUMOCOCCAL POLYSACCHARIDE VACCINE AGE 65 AND OVER  Completed     PNEUMOCOCCAL CONJUGATE VACCINE FOR ADULTS (PCV13 OR PREVNAR)   Completed        Subjective:   Chief Complaint: You Montano is an 68 y.o. male here for an Annual Wellness visit.   HPI: Ocular degeneration, follow-up with ophthalmologist, insomnia, managed with Klonopin, has been seen neurologist.    Review of Systems:    Please see above.  The rest of the review of systems are negative for all systems.    Patient Care Team:  Vincenzo Malik MD as PCP - General (Family Medicine)     Patient Active Problem List   Diagnosis     Parasomnia, unspecified     Low libido     Macular degeneration (senile) of retina     No past medical history on file.   No past surgical history on file.   No family history on file.   Social History     Socioeconomic History     Marital status:      Spouse name: Not on file     Number of children: Not on file     Years of education: Not on file     Highest education level: Not on file   Occupational History     Not on file   Social Needs     Financial resource strain: Not on file     Food insecurity:     Worry: Not on file     Inability: Not on file     Transportation needs:     Medical: Not on file     Non-medical: Not on file   Tobacco Use     Smoking status: Former Smoker     Smokeless tobacco: Never Used   Substance and Sexual Activity     Alcohol use: Not on file     Drug use: Not on file     Sexual activity: Not on file   Lifestyle     Physical activity:     Days per week: Not on file     Minutes per session: Not on file     Stress: Not on file   Relationships     Social connections:     Talks on phone: Not on file     Gets together: Not on file     Attends Congregation service: Not on file     Active member of club or organization: Not on file     Attends meetings of clubs or organizations: Not on file     Relationship status: Not on file     Intimate partner violence:     Fear of current or ex partner: Not on file     Emotionally abused: Not on file     Physically abused: Not on file     Forced sexual activity: Not on file  "  Other Topics Concern     Not on file   Social History Narrative     Not on file      Current Outpatient Medications   Medication Sig Dispense Refill     albuterol (VENTOLIN HFA) 90 mcg/actuation inhaler INHALE 2 PUFFS BY MOUTH EVERY 6 HOURS AS NEEDED FOR WHEEZING 18 g 3     budesonide-formoterol (SYMBICORT) 80-4.5 mcg/actuation inhaler Inhale 2 puffs 2 (two) times a day. 1 Inhaler 12     clonazePAM (KLONOPIN) 2 MG tablet Take 2 mg by mouth.       ranitidine (ZANTAC) 150 MG tablet TAKE 1 TABLET(150 MG) BY MOUTH AT BEDTIME 90 tablet 11     sildenafil (REVATIO) 20 mg tablet Take 2-3 tabs PO 1 hr before sex 30 tablet 3     No current facility-administered medications for this visit.       Objective:   Vital Signs:   Visit Vitals  /55 (Patient Site: Right Arm, Patient Position: Sitting, Cuff Size: Adult Regular)   Pulse 71   Ht 5' 5.5\" (1.664 m)   Wt 155 lb (70.3 kg)   SpO2 95%   BMI 25.40 kg/m         VisionScreening:  No exam data present     PHYSICAL EXAM  Physical Examination: General appearance - alert, well appearing, and in no distress  Mental status - alert, oriented to person, place, and time  Eyes - pupils equal and reactive, extraocular eye movements intact  Ears - bilateral TM's and external ear canals normal  Nose - normal and patent, no erythema, discharge or polyps  Mouth - mucous membranes moist, pharynx normal without lesions  Neck - supple, no significant adenopathy  Lymphatics - no palpable lymphadenopathy, no hepatosplenomegaly  Chest - clear to auscultation, no wheezes, rales or rhonchi, symmetric air entry  Heart - normal rate, regular rhythm, normal S1, S2, no murmurs, rubs, clicks or gallops  Abdomen - soft, nontender, nondistended, no masses or organomegaly   Male - no penile lesions or discharge, no testicular masses or tenderness, no hernias  Rectal - negative without mass, lesions or tenderness  Back exam - full range of motion, no tenderness, palpable spasm or pain on " motion  Neurological - alert, oriented, normal speech, no focal findings or movement disorder noted  Musculoskeletal - no joint tenderness, deformity or swelling  Extremities - peripheral pulses normal, no pedal edema, no clubbing or cyanosis  Skin - normal coloration and turgor, no rashes, no suspicious skin lesions noted    Assessment Results 8/6/2019   Activities of Daily Living No help needed   Instrumental Activities of Daily Living No help needed   Mini Cog Total Score 3   Some recent data might be hidden     A Mini-Cog score of 0-2 suggests the possibility of dementia, score of 3-5 suggests no dementia    Identified Health Risks:     The patient reports that he drinks more than one alcoholic drink per day but denies binge or excessive drinking. He was counseled and given information about possible harmful effects of excessive alcohol intake.  The patient was counseled and encouraged to consider modifying their diet and eating habits. He was provided with information on recommended healthy diet options.  The patient was provided with written information regarding signs of hearing loss.  Patient's advanced directive was discussed and I have discussed my concerns regarding the patient's wishes.  Honoring  choice form given to patient complete and bring back.

## 2021-06-01 VITALS — HEIGHT: 67 IN | BODY MASS INDEX: 24.8 KG/M2 | WEIGHT: 158 LBS

## 2021-06-02 VITALS — BODY MASS INDEX: 24.8 KG/M2 | HEIGHT: 67 IN | WEIGHT: 158 LBS

## 2021-06-02 NOTE — TELEPHONE ENCOUNTER
Refill Approved    Rx renewed per Medication Renewal Policy. Medication was last renewed on 08/06/2019.    Last office visit: 08/06/2019 w/ Dr. Malik PCP     Ligia Garduno, [facility] Triage/Med Refill 10/15/2019     Requested Prescriptions   Pending Prescriptions Disp Refills     albuterol (VENTOLIN HFA) 90 mcg/actuation inhaler 18 g 3     Sig: INHALE 2 PUFFS BY MOUTH EVERY 6 HOURS AS NEEDED FOR WHEEZING       Albuterol/Levalbuterol Refill Protocol Passed - 10/15/2019  3:13 PM        Passed - PCP or prescribing provider visit in last year     Last office visit with prescriber/PCP: Visit date not found OR same dept: Visit date not found OR same specialty: Visit date not found Last physical: 8/6/2019       Next appt within 3 mo: Visit date not found  Next physical within 3 mo: Visit date not found  Prescriber OR PCP: Vincenzo Malik MD  Last diagnosis associated with med order: 1. SOB (shortness of breath)  - albuterol (VENTOLIN HFA) 90 mcg/actuation inhaler; INHALE 2 PUFFS BY MOUTH EVERY 6 HOURS AS NEEDED FOR WHEEZING  Dispense: 18 g; Refill: 3    If protocol passes may refill for 6 months if within 3 months of last provider visit (or a total of 9 months). If patient requesting >1 inhaler per month refill x 6 months and have patient make appointment with provider.

## 2021-06-02 NOTE — TELEPHONE ENCOUNTER
Medication Request  Medication name:   )    Associated Diagnoses     SOB (shortness of breath)       Pharmacy     [id number] - [place], [place] - [address] AT [facility]   Additional Information     Associated Reports   View Encounter   Priority and Order Details       Pharmacy Name and Location: [facility]   Reason for request: New prescription to this pharmacy  When did you use medication last?:  unknown  Patient offered appointment:  NA  Okay to leave a detailed message: yes  [place] :[phone]

## 2021-06-02 NOTE — TELEPHONE ENCOUNTER
FYI - Status Update  Who is Calling: Pharmacy  Update: Please resend to mail order pharmacy.   Okay to leave a detailed message?:  Yes

## 2021-06-02 NOTE — TELEPHONE ENCOUNTER
Per pharmacy; patient requesting 90 day supply.    Refill Request  Did you contact pharmacy: Request received from patient's pharmacy via fax.   Medication name:   Requested Prescriptions     Pending Prescriptions Disp Refills     albuterol (VENTOLIN HFA) 90 mcg/actuation inhaler 18 g 3     Sig: INHALE 2 PUFFS BY MOUTH EVERY 6 HOURS AS NEEDED FOR WHEEZING     Who prescribed the medication: Vincenzo Malik MD   Pharmacy Name and Location: [facility])  Is patient out of medication: Information not provided.   Patient notified refills processed in 72 hours:  no  Okay to leave a detailed message: no

## 2021-06-03 VITALS — BODY MASS INDEX: 24.91 KG/M2 | WEIGHT: 155 LBS | HEIGHT: 66 IN

## 2021-06-03 VITALS — HEIGHT: 67 IN | BODY MASS INDEX: 25.58 KG/M2 | WEIGHT: 163 LBS

## 2021-06-05 VITALS
SYSTOLIC BLOOD PRESSURE: 153 MMHG | BODY MASS INDEX: 26.16 KG/M2 | HEART RATE: 82 BPM | HEIGHT: 65 IN | WEIGHT: 157 LBS | OXYGEN SATURATION: 99 % | DIASTOLIC BLOOD PRESSURE: 69 MMHG | RESPIRATION RATE: 16 BRPM

## 2021-06-05 VITALS — BODY MASS INDEX: 25.84 KG/M2 | HEIGHT: 66 IN | WEIGHT: 160.8 LBS

## 2021-06-06 NOTE — TELEPHONE ENCOUNTER
Medication Question or Clarification  Who is calling: [facility]  What medication are you calling about (include dose and sig)?: Symbicort 80-4.5 mcg/actuation inhaler, 2 puffs two times a day   Who prescribed the medication?: Vincenzo Malik MD   What is your question/concern?: Patient would like a 90 day supply  Requested Pharmacy: [facility]  Okay to leave a detailed message?: No

## 2021-06-08 NOTE — TELEPHONE ENCOUNTER
Left detailed message on patient's personal VM, of cologuard results from 4/21/20. Results: Negative; patient is good for 3 years till next Cologuard check. If additional questions, please call back to clinic.

## 2021-06-12 NOTE — TELEPHONE ENCOUNTER
Who is calling:  Patient  Reason for Call:    Patient is requesting status of medication.  Patient is out of medication.  Patient states if he needs a virtual appointment, let him know.  Thank you.  Date of last appointment with primary care: 8/16/2019  Okay to leave a detailed message: Yes

## 2021-06-12 NOTE — PROGRESS NOTES
Assessment and Plan:     Patient has been advised of split billing requirements and indicates understanding: No  1. Encounter for general adult medical examination with abnormal findings  Patient [employer] insurance is asking him to see a new PCP looking for 1.    2. GERD (gastroesophageal reflux disease)    - famotidine (PEPCID) 40 MG tablet; Take 1 tablet (40 mg total) by mouth daily.  Dispense: 90 tablet; Refill: 3  - HM2(CBC w/o Differential)  - Comprehensive Metabolic Panel  - Lipid Cascade    3. Chronic obstructive pulmonary disease, unspecified COPD type (H)    - CT Low Dose Lung Screening Chest; Future    4. Screening for endocrine, nutritional, metabolic and immunity disorder    - HM2(CBC w/o Differential)  - Thyroid [place]    5. Encounter for HCV screening test for low risk patient    - Hepatitis C Antibody (Anti-HCV)    6. Screening PSA (prostate specific antigen)    - PSA (Prostatic-Specific Antigen), Annual Screen    7. Abnormal findings on diagnostic imaging of other parts of digestive tract     - Lipid Cascade    8. H/O nicotine dependence  - CT Low Dose Lung Screening Chest; Future    9. Coronary artery calcification  Cholesterol-lowering medication discussed, with possible side effect Lipitor 10 mg prescribed.  - atorvastatin (LIPITOR) 10 MG tablet; Take 1 tablet (10 mg total) by mouth daily.  Dispense: 90 tablet; Refill: 3    10. Hyperlipidemia LDL goal <70  - atorvastatin (LIPITOR) 10 MG tablet; Take 1 tablet (10 mg total) by mouth daily.  Dispense: 90 tablet; Refill: 3     The patient's current medical problems were reviewed.    I have had an Advance Directives discussion with the patient.  The following high BMI interventions were performed this visit: encouragement to exercise  The following health maintenance schedule was reviewed with the patient and provided in printed form in the after visit summary:   Health Maintenance   Topic Date Due     COPD ACTION PLAN  1950     ZOSTER VACCINES  (1 of 2) 08/27/1999     INFLUENZA VACCINE RULE BASED (1) 08/01/2020     MEDICARE ANNUAL WELLNESS VISIT  11/05/2021     FALL RISK ASSESSMENT  11/05/2021     TD 18+ HE  02/28/2022     COLORECTAL CANCER SCREENING  04/21/2023     LIPID  11/05/2025     ADVANCE CARE PLANNING  11/05/2025     HEPATITIS C SCREENING  Completed     SPIROMETRY  Completed     Pneumococcal Vaccine: 65+ Years  Completed     Pneumococcal Vaccine: Pediatrics (0 to 5 Years) and At-Risk Patients (6 to 64 Years)  Aged Out     HEPATITIS B VACCINES  Aged Out        Subjective:   Chief Complaint: You Montano is an 70 y.o. male here for an Annual Wellness visit.   HPI: Overall doing fine, [employer] is asking to look for different provider outside [place].  Pepcid does help for GERD symptoms, PFT did show severe COPD with an ratio 47% last year.  Low-dose CT scan screening for cancer was negative due for another one this year.  Incidental finding of coronary calcification on screening lung cancer CT, risk and benefit of statin therapy discussed.  Patient is okay to start Lipitor 10 mg, will follow monitoring liver function an LDL goal should be below 100    Review of Systems:    Please see above.  The rest of the review of systems are negative for all systems.    Patient Care Team:  Vincenzo Malik MD as PCP - General (Family Medicine)  Vincenzo Malik MD as Assigned PCP     Patient Active Problem List   Diagnosis     Parasomnia, unspecified     Low libido     Macular degeneration (senile) of retina     Chronic obstructive pulmonary disease, unspecified COPD type (H)     No past medical history on file.   No past surgical history on file.   No family history on file.   Social History     Socioeconomic History     Marital status:      Spouse name: Not on file     Number of children: Not on file     Years of education: Not on file     Highest education level: Not on file   Occupational History     Not on file   Social Needs      Financial resource strain: Not on file     Food insecurity     Worry: Not on file     Inability: Not on file     Transportation needs     Medical: Not on file     Non-medical: Not on file   Tobacco Use     Smoking status: Former Smoker     Smokeless tobacco: Never Used   Substance and Sexual Activity     Alcohol use: Not on file     Drug use: Not on file     Sexual activity: Not on file   Lifestyle     Physical activity     Days per week: Not on file     Minutes per session: Not on file     Stress: Not on file   Relationships     Social connections     Talks on phone: Not on file     Gets together: Not on file     Attends Sikhism service: Not on file     Active member of club or organization: Not on file     Attends meetings of clubs or organizations: Not on file     Relationship status: Not on file     Intimate partner violence     Fear of current or ex partner: Not on file     Emotionally abused: Not on file     Physically abused: Not on file     Forced sexual activity: Not on file   Other Topics Concern     Not on file   Social History Narrative     Not on file      Current Outpatient Medications   Medication Sig Dispense Refill     albuterol (PROAIR HFA;PROVENTIL HFA;VENTOLIN HFA) 90 mcg/actuation inhaler INHALE 2 PUFFS INTO THE LUNGS EVERY 6 HOURS AS NEEDED FOR WHEEZING 54 g 0     clonazePAM (KLONOPIN) 2 MG tablet Take 2 mg by mouth.       famotidine (PEPCID) 40 MG tablet Take 1 tablet (40 mg total) by mouth daily. 90 tablet 3     sildenafil (REVATIO) 20 mg tablet Take 2-3 tabs PO 1 hr before sex 30 tablet 3     atorvastatin (LIPITOR) 10 MG tablet Take 1 tablet (10 mg total) by mouth daily. 90 tablet 3     budesonide-formoteroL (SYMBICORT) 80-4.5 mcg/actuation inhaler Inhale 2 puffs 2 (two) times a day. 5 Inhaler 6     No current facility-administered medications for this visit.       Objective:   Vital Signs:   Visit Vitals  /69 (Patient Site: Left Arm, Patient Position: Sitting, Cuff Size: Adult  "Regular)   Pulse 82   Resp 16   Ht 5' 5\" (1.651 m)   Wt 157 lb (71.2 kg)   SpO2 99%   BMI 26.13 kg/m           VisionScreening:  No exam data present     PHYSICAL EXAM  Physical Examination: General appearance - alert, well appearing, and in no distress  Mental status - alert, oriented to person, place, and time  Eyes - pupils equal and reactive, extraocular eye movements intact  Ears - bilateral TM's and external ear canals normal  Nose - normal and patent, no erythema, discharge or polyps  Mouth - mucous membranes moist, pharynx normal without lesions  Neck - supple, no significant adenopathy  Lymphatics - no palpable lymphadenopathy, no hepatosplenomegaly  Chest - clear to auscultation, no wheezes, rales or rhonchi, symmetric air entry  Heart - normal rate, regular rhythm, normal S1, S2, no murmurs, rubs, clicks or gallops  Abdomen - soft, nontender, nondistended, no masses or organomegaly  Rectal -declined digital rectal exam.  Back exam - full range of motion, no tenderness, palpable spasm or pain on motion  Neurological - alert, oriented, normal speech, no focal findings or movement disorder noted  Musculoskeletal - no joint tenderness, deformity or swelling  Extremities - peripheral pulses normal, no pedal edema, no clubbing or cyanosis  Skin - normal coloration and turgor, no rashes, no suspicious skin lesions noted    Assessment Results 11/5/2020   Activities of Daily Living No help needed   Instrumental Activities of Daily Living No help needed   Mini Cog Total Score 5   Some recent data might be hidden     A Mini-Cog score of 0-2 suggests the possibility of dementia, score of 3-5 suggests no dementia      Identified Health Risks:     Patient's advanced directive was discussed and I have discussed my concerns regarding the patient's wishes.        "

## 2021-06-12 NOTE — TELEPHONE ENCOUNTER
RN cannot approve Refill Request    RN can NOT refill this medication Protocol failed and NO refill given. Last office visit: Visit date not found Last Physical: 8/6/2019 Last MTM visit: Visit date not found Last visit same specialty: Visit date not found.  Next visit within 3 mo: Visit date not found  Next physical within 3 mo: Visit date not found      Perla Acevedo, [facility] Connection Triage/Med Refill 10/19/2020    Requested Prescriptions   Pending Prescriptions Disp Refills     albuterol (PROAIR HFA;PROVENTIL HFA;VENTOLIN HFA) 90 mcg/actuation inhaler 54 g 1     Sig: INHALE 2 PUFFS INTO THE LUNGS EVERY 6 HOURS AS NEEDED FOR WHEEZING       Albuterol/Levalbuterol Refill Protocol Failed - 10/19/2020  1:57 PM        Failed - PCP or prescribing provider visit in last year     Last office visit with prescriber/PCP: Visit date not found OR same dept: Visit date not found OR same specialty: Visit date not found Last physical: 8/6/2019       Next appt within 3 mo: Visit date not found  Next physical within 3 mo: Visit date not found  Prescriber OR PCP: Vincenzo Malik MD  Last diagnosis associated with med order: 1. SOB (shortness of breath)  - albuterol (PROAIR HFA;PROVENTIL HFA;VENTOLIN HFA) 90 mcg/actuation inhaler; INHALE 2 PUFFS INTO THE LUNGS EVERY 6 HOURS AS NEEDED FOR WHEEZING  Dispense: 54 g; Refill: 1    If protocol passes may refill for 6 months if within 3 months of last provider visit (or a total of 9 months). If patient requesting >1 inhaler per month refill x 6 months and have patient make appointment with provider.

## 2021-06-12 NOTE — TELEPHONE ENCOUNTER
Refill Request  Did you contact pharmacy: Yes  Medication name:   Requested Prescriptions     Pending Prescriptions Disp Refills     albuterol (PROAIR HFA;PROVENTIL HFA;VENTOLIN HFA) 90 mcg/actuation inhaler 54 g 1     Sig: INHALE 2 PUFFS INTO THE LUNGS EVERY 6 HOURS AS NEEDED FOR WHEEZING     Who prescribed the medication: Vincenzo Malik MD  Requested Pharmacy: [facility] #[id number]  Is patient out of medication: Yes  Patient notified refills processed in 3 business days:  no  Okay to leave a detailed message: no    FYI: patient is out of his medication. Patient stated he was informed by his pharmacy that they have sent multiple refill requests with no response.

## 2021-06-12 NOTE — PATIENT INSTRUCTIONS - HE
Advance Directive  Patient s advance directive was discussed and I am comfortable with the patient s wishes.  Patient Education   Personalized Prevention Plan  You are due for the preventive services outlined below.  Your care team is available to assist you in scheduling these services.  If you have already completed any of these items, please share that information with your care team to update in your medical record.  Health Maintenance   Topic Date Due     HEPATITIS C SCREENING  1950     ZOSTER VACCINES (1 of 2) 08/27/1999     INFLUENZA VACCINE RULE BASED (1) 08/01/2020     MEDICARE ANNUAL WELLNESS VISIT  11/05/2021     FALL RISK ASSESSMENT  11/05/2021     TD 18+ HE  02/28/2022     COLORECTAL CANCER SCREENING  04/21/2023     LIPID  08/06/2024     ADVANCE CARE PLANNING  08/06/2024     Pneumococcal Vaccine: 65+ Years  Completed     Pneumococcal Vaccine: Pediatrics (0 to 5 Years) and At-Risk Patients (6 to 64 Years)  Aged Out     HEPATITIS B VACCINES  Aged Out

## 2021-06-13 NOTE — PROGRESS NOTES
"You Montano is a 70 y.o. male who is being evaluated via a billable telephone visit.      The patient has been notified of following:     \"This telephone visit will be conducted via a call between you and your physician/provider. We have found that certain health care needs can be provided without the need for a physical exam.  This service lets us provide the care you need with a short phone conversation.  If a prescription is necessary we can send it directly to your pharmacy.  If lab work is needed we can place an order for that and you can then stop by our lab to have the test done at a later time.    Telephone visits are billed at different rates depending on your insurance coverage. During this emergency period, for some insurers they may be billed the same as an in-person visit.  Please reach out to your insurance provider with any questions.    If during the course of the call the physician/provider feels a telephone visit is not appropriate, you will not be charged for this service.\"    Patient has given verbal consent to a Telephone visit? Yes    What phone number would you like to be contacted at? [phone]    Patient would like to receive their AVS by AVS Preference: Mail a copy.    Additional provider notes: Fell at home about a week ago, fracturing his right hip, did have a right hip arthroplasty, right hip pain is improved,he is now having left knee pain, mild to moderate, acetaminophen and hydrocodone help him, working with a home physical therapist.  Does have COPD, would like a new nebulizer machine and tubing also interested in doing incentive spirometry at home to reinforce his lung capacity.  Wife has noticed memory loss, interested in a follow-up with the memory clinic.  He does have [employer] insurance, will not be part of [employer] anymore beginning of 2021      Hospital Follow-up Visit:    Assessment/Plan:     1. Hospital discharge follow-up    2. Closed fracture of neck of " right femur, initial encounter (H)  Has been doing home rehabilitation, he does have a follow-up appointment with orthopedics in a few days.  Symptomatic care for left knee pain discussed.  3. Chronic obstructive pulmonary disease, unspecified COPD type (H)  - Nebulizer with supplies (cup, tubing, mask, & filters)  - Nebulizer Supplies (cup, tubing, mask, & filters)  - Incentive spirometry RT; Future    4. Episodic memory loss  - Ambulatory referral to Dementia/Memory Loss Clinic        Subjective:     You Montano is a 70 y.o. male who presents for a hospital discharge follow up.      Hospital/Nursing Home/IP Rehab Facility: [facility]  Date of Admission: 12/1/20   Date of Discharge:12/4/20  Reason(s) for Admission:Right hip fracture status post hip arthroplasty.            Do you have any problems taking your medication regularly?  None       Have you had any changes in your medication since discharge? None       Have you had any difficulty following your discharge or treatment plan?  No    Summary of hospitalization:  Hospital discharge summary reviewed  Diagnostic Tests/Treatments reviewed.  Follow up needed: None  Other Healthcare Providers Involved in Patient's Care: Patient Care Team:  Vincenzo Malik MD as PCP - General (Family Medicine)  Vincenzo Malik MD as Assigned PCP      Update since discharge: {improved   Information from family, SNF, care coordination: Wife Marni would nebulizer machine, nebulizer tubing, and possibly incentive spirometry to work on his breathing at home.    Post Discharge Medication Reconciliation: discharge medications reconciled, continue medications without change  Plan of care communicated with: significant other    Objective:     There were no vitals filed for this visit.      Physical Exam:  Patient not available.      Coding guidelines for this visit:  Type of Medical   Decision Making Face-to-Face Visit       within 7 Days of discharge  Face-to-Face Visit        within 14 days of discharge   Moderate Complexity [id number]   High Complexity [id number]       Electronically signed by Vincenzo Malik MD 12/10/20 11:42 AM     Phone call duration:  20 minutes    Vincenzo Malik MD

## 2021-06-13 NOTE — TELEPHONE ENCOUNTER
Request for Orders    Who s Requesting: Home Care Physical Therapist    Orders being requested: PT: 2x/week x 3 weeks  OT pam  HHA for assist with bathing 2x/week x 3 weeks    Where to send Orders: Reply to message, thank you.

## 2021-06-13 NOTE — ANESTHESIA CARE TRANSFER NOTE
Last vitals:   Vitals:    12/02/20 1455   BP: 142/63   Pulse: 67   Resp: 16   Temp: 37.1  C (98.8  F)   SpO2: 93%     Patient's level of consciousness is drowsy  Spontaneous respirations: yes  Maintains airway independently: yes  Dentition unchanged: yes  Oropharynx: oropharynx clear of all foreign objects    QCDR Measures:  ASA# 20 - Surgical Safety Checklist: WHO surgical safety checklist completed prior to induction    PQRS# 430 - Adult PONV Prevention: 4558F - Pt received => 2 anti-emetic agents (different classes) preop & intraop  ASA# 8 - Peds PONV Prevention: 4558F - Pt received => 2 anti-emetic agents (different classes) preop & intraop  PQRS# 424 - Senia-op Temp Management: 4559F - At least one body temp DOCUMENTED => 35.5C or 95.9F within required timeframe  PQRS# 426 - PACU Transfer Protocol: - Transfer of care checklist used  ASA# 14 - Acute Post-op Pain: ASA14B - Patient did NOT experience pain >= 7 out of 10

## 2021-06-13 NOTE — TELEPHONE ENCOUNTER
"Who is calling:  Patient  Reason for Call:  States \"some doctor\" from [facility] called him about osteoporosis. Patient would like to discuss this with Dr Malik.    Okay to leave a detailed message: Yes      "

## 2021-06-13 NOTE — ANESTHESIA POSTPROCEDURE EVALUATION
Patient: You Montano  Procedure(s):  ARTHROPLASTY, HIP, TOTAL, DIRECT ANTERIOR APPROACH (Right)  Anesthesia type: spinal    Patient location: PACU  Last vitals:   Vitals Value Taken Time   /58 12/02/20 1540   Temp 37.5  C (99.5  F) 12/02/20 1520   Pulse 70 12/02/20 1545   Resp 22 12/02/20 1545   SpO2 95 % 12/02/20 1545   Vitals shown include unvalidated device data.  Post vital signs: stable  Level of consciousness: awake and responds to simple questions  Post-anesthesia pain: pain controlled  Post-anesthesia nausea and vomiting: no  Pulmonary: unassisted, return to baseline  Cardiovascular: stable and blood pressure at baseline  Hydration: adequate  Anesthetic events: no    QCDR Measures:  ASA# 11 - Senia-op Cardiac Arrest: ASA11B - Patient did NOT experience unanticipated cardiac arrest  ASA# 12 - Senia-op Mortality Rate: ASA12B - Patient did NOT die  ASA# 13 - PACU Re-Intubation Rate: ASA13B - Patient did NOT require a new airway mgmt  ASA# 10 - Composite Anes Safety: ASA10A - No serious adverse event    Additional Notes:

## 2021-06-13 NOTE — TELEPHONE ENCOUNTER
Who is calling:  Patient Wife Marni Montano  Reason for Call:  Patient wife states patient is complaining Left knee pain not able to move requesting cream that can help with his pain . For questions please reach out patient .  Date of last appointment with primary care: 12/10/20  Okay to leave a detailed message: No

## 2021-06-13 NOTE — TELEPHONE ENCOUNTER
You calls in to report that he had a fall recently.  He is having 5-10 pain depending on if he is trying to move or not.  He hurt his hip or leg.  He can not ambulate. He would like to be seen at [facility] in [place] and would like you to send a referral there for him to be seen. He has an appointment today apparently.  Please call Bill if any questions at [phone]    Reason for Disposition    Can't stand (bear weight) or walk     He does not want to go to ER.  It happened yesterday.  He would like to go to [facility].    Protocols used: HIP INJURY-A-OH

## 2021-06-13 NOTE — ANESTHESIA PREPROCEDURE EVALUATION
Anesthesia Evaluation      Patient summary reviewed   No history of anesthetic complications     Airway   Mallampati: I   Pulmonary - normal exam   (+) COPD moderate, shortness of breath,                          Cardiovascular - negative ROS and normal exam   Neuro/Psych - negative ROS     Endo/Other - negative ROS      GI/Hepatic/Renal - negative ROS           Dental                         Anesthesia Plan  Planned anesthetic: spinal    ASA 2     Anesthetic plan and risks discussed with: patient    Post-op plan: routine recovery

## 2021-06-13 NOTE — ANESTHESIA PROCEDURE NOTES
Spinal Block    Patient location during procedure: OR  Start time: 12/2/2020 1:32 PM  End time: 12/2/2020 1:34 PM  Reason for block: primary anesthetic    Staffing:  Performing  Anesthesiologist: Pepper Ford MD    Preanesthetic Checklist  Completed: patient identified, risks, benefits, and alternatives discussed, timeout performed, consent obtained, at patient's request, airway assessed, oxygen available, suction available, emergency drugs available and hand hygiene performed  Spinal Block  Patient position: left lateral decubitus  Prep: ChloraPrep and x2  Patient monitoring: heart rate, continuous pulse ox, blood pressure and cardiac monitor  Approach: midline  Location: L4-5  Injection technique: single-shot  Needle type: pencil-tip   Needle gauge: 25 G

## 2021-06-13 NOTE — TELEPHONE ENCOUNTER
Who is calling:  Patient  Reason for Call:    Patient states [facility] needs the prescription for the nebulizer machine and supplies so they can send to patient.  Date of last appointment with primary care: 11/5/2020  Okay to leave a detailed message: Yes

## 2021-06-13 NOTE — TELEPHONE ENCOUNTER
Orders being requested:   Nebulizer and supplies  Order/prescription for Nebulizer solution.    Reason service is needed/diagnosis:   [facility] does not cover inhaler.  Patient is requesting Nebulizer, Supplies, Nebulizer solution.    When are orders needed by:   As soon as possible.    Where to send Orders: [facility]     Okay to leave detailed message?  Yes

## 2021-06-13 NOTE — TELEPHONE ENCOUNTER
Dr. Malik, the pt stated that he received a prescription for the albuterol solution but he does not have a neb machine nor the accessories. If pt is supposed to get a machine then if you can write up an order and we will fax it to a medical supply company or if you are ok, the pt can stop by the clinic to pick one up. Please advise. Thank you.

## 2021-06-13 NOTE — TELEPHONE ENCOUNTER
Request for Orders    Who s Requesting: Home Care Occupational Therapist    Orders being requested: OT to f/u 1w1 for bathing independence    Where to send Orders: Respond through Trudy Bolanos OTR/L

## 2021-06-13 NOTE — TELEPHONE ENCOUNTER
Not sure who called the patient, please double check with patient wife since patient is having some memory issues.

## 2021-06-13 NOTE — TELEPHONE ENCOUNTER
Prescription for nebulizer solution was sent to [facility], please fax nebulizer machine and supply to [employer].  Thank you

## 2021-06-16 PROBLEM — G47.50 PARASOMNIA, UNSPECIFIED: Status: ACTIVE | Noted: 2018-07-11

## 2021-06-16 PROBLEM — R68.82 LOW LIBIDO: Status: ACTIVE | Noted: 2018-07-11

## 2021-06-16 PROBLEM — Z86.0100 HISTORY OF COLONIC POLYPS: Status: ACTIVE | Noted: 2020-12-10

## 2021-06-16 PROBLEM — R41.3 EPISODIC MEMORY LOSS: Status: ACTIVE | Noted: 2020-12-10

## 2021-06-16 PROBLEM — H35.30 MACULAR DEGENERATION (SENILE) OF RETINA: Status: ACTIVE | Noted: 2018-07-11

## 2021-06-16 PROBLEM — J44.9 CHRONIC OBSTRUCTIVE PULMONARY DISEASE, UNSPECIFIED COPD TYPE (H): Status: ACTIVE | Noted: 2020-11-05

## 2021-06-16 PROBLEM — S72.001A CLOSED FRACTURE OF NECK OF RIGHT FEMUR, INITIAL ENCOUNTER (H): Status: ACTIVE | Noted: 2020-12-01

## 2021-06-16 PROBLEM — S72.001A CLOSED DISPLACED FRACTURE OF RIGHT FEMORAL NECK (H): Status: ACTIVE | Noted: 2020-12-01

## 2021-06-16 NOTE — TELEPHONE ENCOUNTER
Refill Approved    Rx renewed per Medication Renewal Policy. Medication was last renewed on 12/1/20.    Buzz Varma, [facility] Connection Triage/Med Refill 4/21/2021     Requested Prescriptions   Pending Prescriptions Disp Refills     budesonide-formoteroL (SYMBICORT) 80-4.5 mcg/actuation inhaler 1 Inhaler 0     Sig: Inhale 2 puffs 2 (two) times a day.       Asthma Medications Refill Protocol Passed - 4/20/2021 10:20 AM        Passed - PCP or prescribing provider visit in last year     Last office visit with prescriber/PCP: Visit date not found OR same dept: Visit date not found OR same specialty: Visit date not found  Last physical: 11/5/2020 Last MTM visit: Visit date not found    Next appt within 3 mo: Visit date not found Next physical within 3 mo: Visit date not found  Prescriber OR PCP: Vincenzo Malik MD  Last diagnosis associated with med order: 1. Chronic obstructive pulmonary disease, unspecified COPD type (H)  - budesonide-formoteroL (SYMBICORT) 80-4.5 mcg/actuation inhaler; Inhale 2 puffs 2 (two) times a day.  Dispense: 1 Inhaler; Refill: 0    If protocol passes may refill for 6 months if within 3 months of last provider visit (or a total of 9 months).

## 2021-06-16 NOTE — TELEPHONE ENCOUNTER
Reason for Call:  Medication or medication refill:    Do you use a [facility]?  Name of the pharmacy and phone number for the current request[facility] [phone]  Fax [phone]    Name of the medication requested: famotidine 40mg artovastatin 10 mg    Other request: n/a    Can we leave a detailed message on this number? Yes    Phone number patient can be reached at: Other phone number:  [phone]    Best Time: any    Call taken on 3/16/2021 at 11:54 AM by Aundrea Yoo

## 2021-06-16 NOTE — TELEPHONE ENCOUNTER
Requested Prescriptions     Pending Prescriptions Disp Refills     budesonide-formoteroL (SYMBICORT) 80-4.5 mcg/actuation inhaler 1 Inhaler 0     Sig: Inhale 2 puffs 2 (two) times a day.

## 2021-06-17 NOTE — PATIENT INSTRUCTIONS - HE
Patient Instructions by Vincenzo Malik MD at 8/6/2019 10:20 AM     Author: Vincenzo Malik MD Service: -- Author Type: Physician    Filed: 8/6/2019 10:56 AM Encounter Date: 8/6/2019 Status: Signed    : Vincenzo Malik MD (Physician)         Patient Education   Alcohol Use   Many people can enjoy a glass of wine or beer without any negative consequences to their health. According to the Centers for Disease Control and Prevention (CDC), having one or fewer drinks per day for women and two or fewer per day for men is considered moderate drinking.     When people drink more than moderately, it can become concerning. Excessive drinking is defined as consuming 15 drinks or more per week for men and 8 drinks or more per week for women. There are various health problems associated with excessive drinking, which include:    Damage to vital organs like the heart, brain, liver and pancreas    Harm to the digestive tract    Weaken the immune system    Higher risk for heart disease and cancer       Patient Education   Understanding [employer] MyPlate  The USDA (US Department of Agriculture) has guidelines to help you make healthy food choices. These are called MyPlate. MyPlate shows the food groups that make up healthy meals using the image of a place setting. Before you eat, think about the healthiest choices for what to put onto your plate or into your cup or bowl. To learn more about building a healthy plate, visit www.choosemyplate.gov.       The Food Groups    Fruits: Any fruit or 100% fruit juice counts as part of the Fruit Group. Fruits may be fresh, canned, frozen, or dried, and may be whole, cut-up, or pureed. Make half your plate fruits and vegetables.    Vegetables: Any vegetable or 100% vegetable juice counts as a member of the Vegetable Group. Vegetables may be fresh, frozen, canned, or dried. They can be served raw or cooked and may be whole, cut-up, or mashed. Make half your plate  fruits and vegetables.     Grains: All foods made from grains are part of the Grains Group. These include wheat, rice, oats, cornmeal, and barley such as bread, pasta, oatmeal, cereal, tortillas, and grits. Grains should be no more than a quarter of your plate. At least half of your grains should be whole grains.    Protein: This group includes meat, poultry, seafood, beans and peas, eggs, processed soy products (like tofu), nuts (including nut butters), and seeds. Make protein choices no more than a quarter of your plate. Meat and poultry choices should be lean or low fat.    Dairy: All fluid milk products and foods made from milk that contain calcium, like yogurt and cheese are part of the Dairy Group. (Foods that have little calcium, such as cream, butter, and cream cheese, are not part of the group.) Most dairy choices should be low-fat or fat-free.    Oils: These are fats that are liquid at room temperature. They include canola, corn, olive, soybean, and sunflower oil. Foods that are mainly oil include mayonnaise, certain salad dressings, and soft margarines. You should have only 5 to 7 teaspoons of oils a day. You probably already get this much from the food you eat.  Use [employer] to Help Build Your Meals  The SuperTracker can help you plan and track your meals and activity. You can look up individual foods to see or compare their nutritional value. You can get guidelines for what and how much you should eat. You can compare your food choices. And you can assess personal physical activities and see ways you can improve. Go to www.choosemyplate.gov/supertracker/.    9070-0243 [employer]. [address] [place] [address], [place] [address]. All rights reserved. This information is not intended as a substitute for professional medical care. Always follow your healthcare professional's instructions.           Patient Education   Signs of Hearing Loss  Hearing loss is a problem shared by many people. In  fact, it is one of the most common health conditions, particularly as people age. Most people over age 65 have some hearing loss, and by age 80, almost everyone does. Because hearing loss usually occurs slowly over the years, you may not realize your hearing ability has gotten worse.       Have your hearing checked  Contact your [facility] care provider if you:    Have to strain to hear normal conversation.    Have to watch other peoples faces very carefully to follow what theyre saying.    Need to ask people to repeat what theyve said.    Often misunderstand what people are saying.    Turn the volume of the television or radio up so high that others complain.    Feel that people are mumbling when theyre talking to you.    Find that the effort to hear leaves you feeling tired and irritated.    Notice, when using the phone, that you hear better with 1 ear than the other.    7437-6594 [employer]. [address] [place] [address]. All rights reserved. This information is not intended as a substitute for professional medical care. Always follow your healthcare professional's instructions.           Advance Directive  Patients advance directive was discussed and I am comfortable with the patients wishes.  Patient Education   Personalized Prevention Plan  You are due for the preventive services outlined below.  Your care team is available to assist you in scheduling these services.  If you have already completed any of these items, please share that information with your care team to update in your medical record.  Health Maintenance   Topic Date Due   ? HEPATITIS C SCREENING  1950   ? ZOSTER VACCINES (1 of 2) 08/27/1999   ? PNEUMOCOCCAL POLYSACCHARIDE VACCINE AGE 65 AND OVER  08/27/2014   ? INFLUENZA VACCINE RULE BASED (1) 08/01/2019   ? MEDICARE ANNUAL WELLNESS VISIT  07/10/2019   ? COLONOSCOPY  09/06/2019 (Originally 8/27/1999)   ? FALL RISK ASSESSMENT  08/06/2020   ? TD 18+ HE  02/28/2022   ?  ADVANCE CARE PLANNING  07/10/2023   ? PNEUMOCOCCAL CONJUGATE VACCINE FOR ADULTS (PCV13 OR PREVNAR)  Completed

## 2021-06-18 NOTE — LETTER
Letter by Leonid Gray MD at      Author: Leonid Gray MD Service: -- Author Type: --    Filed:  Encounter Date: 2/5/2019 Status: (Other)       Vincenzo Malik MD  [address] [facility] [address]                                  February 5, 2019    Patient: You Montano   MR Number: [id number]   YOB: 1950   Date of Visit: 2/5/2019     Dear Dr. King MD:    Thank you for referring You Montano to me for evaluation. Below are the relevant portions of my assessment and plan of care.    If you have questions, please do not hesitate to call me. I look forward to following You along with you.    Sincerely,        Leonid Gray MD          CC  No Recipients  Leonid Gray MD  2/5/2019  4:23 PM  Sign at close encounter  Assessment and Plan:You Montano is a 68 y.o. with a past medical history significant for a 40-pack-year smoking history, having quit 5-10 years ago, who presents to clinic today for shortness of breath and a cough productive for whitish sputum primarily in the mornings.  He reports occasional heartburn and sleeps flat at night.  He has used a CPAP from [facility] for sleep apnea for the past 15 years and recently had it checked and adjusted.  Until coming to our clinic he had never previously had pulmonary function testing or been formally diagnosed with COPD.  He was recently started a on a as needed albuterol inhaler with a spacer that he uses on average 1-2 times per week and does experience some benefit.    1/29/2019 PFTs: Consistent with moderate obstruction, FEV1 40% of predicted.  Diffusing capacity was decreased at 47% of predicted.  RV was 172% of predicted showing air trapping    1) For nocturnal reflux aspiration (likely causing morning cough and shortness of breath)  - zantac nightly  - avoid food or drink within 3 hours of bedtime  - avoid trigger foods (alcohol, caffeine, spicy foods)  - elevating  head of bed    2) For moderate COPD  - start low-dose Symbicort twice daily  - cont albuterol inhaler with spacer as needed  - he exercises regularly and is able to walk a mile and a half every other day without difficulty.  Pulmonary rehab was discussed however he does not feel the need for it at this time, but may consider it in the future.    3) Lung cancer screening CT    Return in 2 months to follow-up    Lung Cancer Screening pre-scan counseling Visit    The patient fits the risk profile of patients who benefit from this screening:  -The patient is >55 years old and <80 years old  -The patient has 40 pack year history (over 30)  -The patient has smoked within the past 15 years  -The patient has no medical comorbidity severe enough that it would cause mortality prior to mortality due to the lung cancer attempting to be detected.    Discussion with patient regarding the harms associated with LDCT screening include false-negative and false-positive results, incidental findings, overdiagnosis, and radiation exposure were reviewed at length.   The patient understands that pursuing this screening test may result in a biopsy that was not necessary. It may also produced added stress over a nodule that is likely not cancer.    Of 100 patients who get screening, 25 will have a positive scan. Of those 25, only 1 will have cancer.  Overdiagnosis is estimated at 10% of patients-- they would not have been detected in the patient's lifetime without screening. Less than 1% of patients likely had death related to radiation exposure increase.   Average low-dose CT associated with 0.61 to 1.5 mSv. Annual background radiation exposure in the [place] averages 2.4 mS; mammogram is 0.7mSv.    The benefits are reduction in risk of death from lung cancer. The number needed to treat is 320 (for every 320 patients who undergo screening, 1 patient will have a benefit in mortality from early detection from the  screening).    Undergoing this screening implies willingness to pursue further potentially invasive testing to discover potential cancer.    All questions were answered.    The patients results will be followed in our pulmonary registry and will be recalled based on the findings of the CT scan.    CCx: Morning cough and shortness of breath    HPI: Mr. Montano is a pleasant 68-year-old gentleman reports shortness of breath and a cough productive for whitish sputum primarily in the mornings.  He reports occasional heartburn and sleeps flat at night.  He has used a CPAP from [facility] for sleep apnea for the past 15 years and recently had it checked and adjusted.  Until coming to our clinic he had never previously had pulmonary function testing or been formally diagnosed with COPD.  He was recently started a on a as needed albuterol inhaler with a spacer that he uses on average 1-2 times per week and does experience some benefit.      PMH:  Sleep apnea  Heartburn  Low libido    PSH:  No past surgical history on file.    SH:  Social History     Socioeconomic History   ? Marital status:      Spouse name: Not on file   ? Number of children: Not on file   ? Years of education: Not on file   ? Highest education level: Not on file   Social Needs   ? Financial resource strain: Not on file   ? Food insecurity - worry: Not on file   ? Food insecurity - inability: Not on file   ? Transportation needs - medical: Not on file   ? Transportation needs - non-medical: Not on file   Occupational History   ? Not on file   Tobacco Use   ? Smoking status: Former Smoker   ? Smokeless tobacco: Never Used   Substance and Sexual Activity   ? Alcohol use: Not on file   ? Drug use: Not on file   ? Sexual activity: Not on file   Other Topics Concern   ? Not on file   Social History Narrative   ? Not on file   Smoked 1 pack/day for 40 years, quit 5-10 years ago.  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    ROS:  A review of 12 organ systems was performed with pertinent positives and negatives noted in the HPI.    Current Meds:  Current Outpatient Medications   Medication Sig Note   ? albuterol (PROAIR HFA;PROVENTIL HFA;VENTOLIN HFA) 90 mcg/actuation inhaler Inhale 2 puffs every 6 (six) hours as needed for wheezing.    ? budesonide-formoterol (SYMBICORT) 80-4.5 mcg/actuation inhaler Inhale 2 puffs 2 (two) times a day.    ? clonazePAM (KLONOPIN) 2 MG tablet Take 2 mg by mouth. 7/10/2018: Received from: [employer] [facility] Received Sig: Take 1 tablet by mouth at bedtime.   ? vitamins  A,C,E-zinc-copper 14,320-226-200 unit-mg-unit cap 2 caps daily        Labs:  No results found for this or any previous visit (from the past 72 hour(s)).    I have personally reviewed all imaging and PFT data available pertinent to this visit.      Physical Exam:  /80   Pulse 80   Ht 5' 6.5\" (1.689 m)   Wt 158 lb (71.7 kg)   SpO2 95%   BMI 25.12 kg/m     General - Well nourished  Ears/Mouth - OP pink moist, no thrush  Neck - no cervical lymphadenopathy  Lungs - Clear to ausculation bilaterally   CVS - regular rhythm with no murmurs, rubs or gallups  Abdomen - soft, NT, ND, NABS  Ext - no cyanosis, clubbing or edema  Skin - no rash  Psychology - alert and oriented, answers appropriate        Electronically signed by:    Oliver Gray MD  [facility] Pulmonary and Critical Care Medicine       "

## 2021-06-18 NOTE — LETTER
Letter by Leonid Gray MD at      Author: Leonid Gray MD Service: -- Author Type: --    Filed:  Encounter Date: 1/11/2019 Status: (Other)       You Montano  [address] [place] [address]    January 11, 2019    Dear  Montano,    Welcome to [facility]! Your appointment information is below.   Please bring the following to your appointment:    Insurance Card, so we may scan it for our records    Drivers license or valid ID, so we may scan it for our records    Co-pay (as applicable per your insurance plan)    A current list of your medications including over the counter products such as vitamins and supplements    Your medical records including copies of X-Ray films if you are transferring your care from another clinic.  If you do not have your records, please fill out the release of information form and we will request those records.     Provider: Leonid Gray MD  Appointment Date: February 5, 2019  Arrival Time: 2:45     Location: [address]        [facility], [address]    **Please allow adequate time for your commute and parking. If you are more than 10 minutes late, you may be asked to reschedule.     If you need to cancel or reschedule your appointment, please notify us at least 24 hours prior to your appointment time so we are able to make this time available for another patient.    Thank you for choosing the [facility] for your health care needs. If you have any questions, please do not hesitate to contact us at any time at   [phone]. We look forward to caring for you.     Sincerely,     [facility] staff

## 2021-06-19 NOTE — LETTER
Letter by Leonid Gray MD at      Author: Leonid Gray MD Service: -- Author Type: --    Filed:  Encounter Date: 4/16/2019 Status: (Other)         Vincenzo Malik MD  [address] [facility] [address]                                  April 16, 2019    Patient: You Montano   MR Number: [id number]   YOB: 1950   Date of Visit: 4/16/2019     Dear Dr. King MD:    Thank you for referring You Montano to me for evaluation. Below are the relevant portions of my assessment and plan of care.    If you have questions, please do not hesitate to call me. I look forward to following You along with you.    Sincerely,        Leonid Gray MD          CC  No Recipients  Leonid Gray MD  4/16/2019 12:09 PM  Sign at close encounter  Assessment and Plan:You Montano is a 68 y.o. M with a past medical history significant for a 40-pack-year smoking history, having quit 5-10 years ago, who presented to clinic in Feb 2019 for shortness of breath and a cough productive for whitish sputum primarily in the mornings.  He reported occasional heartburn and sleeps flat at night.  He has used a CPAP from [facility] for sleep apnea for the past 15 years and recently had it checked and adjusted.  Until coming to our clinic he had never previously had pulmonary function testing or been formally diagnosed with COPD.  He  had been recently started a on a as needed albuterol inhaler with a spacer that he uses on average 1-2 times per week and experienced benefit from it.     1/29/2019 PFTs: Consistent with moderate obstruction, FEV1 40% of predicted.  Diffusing capacity was decreased at 47% of predicted.  RV was 172% of predicted showing air trapping    4/16/2019 low-dose noncontrast chest CT: No nodules concerning for malignancy.  Mild cylindrical bronchiectasis in the lower lobes with slight atelectasis was seen.    On 4/16/2019 follow-up visit, the  patient states that his morning cough and shortness of breath have completely resolved since starting to take the antacid.  He tried a wedge pillow felt comfortable with that, and was not able to elevate his head of bed with blocks due to the design that would cause it to break the bed.  He is no longer having shortness of breath.  He is using Symbicort along with albuterol twice daily whether he feels short of breath or not.  He only occasionally needs to use the albuterol at other times on top of that.     1)  Nocturnal reflux aspiration (likely causing morning cough and shortness of breath), continue:  - Zantac nightly  - avoid food or drink within 3 hours of bedtime  - avoid trigger foods (alcohol, caffeine, spicy foods)  - Patient states he will try to elevate his back by placing a wedge pillow under the mattress     2)  Moderate COPD : Shortness of breath resolved since last visit when we started him on Symbicort, however he is also using the albuterol twice a day whether he feels short of breath or not.  He is questioning whether he even needs his Symbicort now.  - Patient advised to use albuterol as a rescue inhaler only when he feels short of breath  - May try stopping Symbicort, however resume if shortness of breath episodes increase significantly  -  Patient does not feel the need for pulmonary rehab as he is able to exercise by walking a mile and a half every other day without difficulty     3) bronchiectasis-new diagnosis per CT scan, images reviewed with the patient: Likely due to chronic reflux aspiration given the bibasilar gravity dependent location  - We will send connective tissue disease workup labs today  - Patient not currently having a cough, however was provided with a sterile specimen cup to submit a sputum culture if his cough recurs  - No current symptoms of shortness of breath, congestion, or cough so will hold off on flutter valve therapy at this time.  May consider if symptoms  worsen.    4) next lung cancer screening CT due April 2020     Patient wishes to wait until his next lung cancer screening CT is due for clinic follow-up, unless new or worsening symptoms are concerning lab results appear in the interim        CCx: Lung cancer screening, COPD, dyspnea    HPI: Mr Montano states that his morning cough and shortness of breath have completely resolved since starting to take the antacid.  He tried a wedge pillow felt comfortable with that, and was not able to elevate his head of bed with blocks due to the design that would cause it to break the bed.  He is no longer having shortness of breath.  He is using Symbicort along with albuterol twice daily whether he feels short of breath or not.  He only occasionally needs to use the albuterol at other times on top of that.    ROS:  A review of 12 organ systems was performed with pertinent positives and negatives noted in the HPI.      Current Meds:  Current Outpatient Medications   Medication Sig Note   ? albuterol (PROAIR HFA;PROVENTIL HFA;VENTOLIN HFA) 90 mcg/actuation inhaler Inhale 2 puffs every 6 (six) hours as needed for wheezing.    ? budesonide-formoterol (SYMBICORT) 80-4.5 mcg/actuation inhaler Inhale 2 puffs 2 (two) times a day.    ? clonazePAM (KLONOPIN) 2 MG tablet Take 2 mg by mouth. 7/10/2018: Received from: [employer] [facility] Received Sig: Take 1 tablet by mouth at bedtime.   ? ranitidine (ZANTAC) 150 MG tablet Take 1 tablet (150 mg total) by mouth at bedtime.    ? vitamins  A,C,E-zinc-copper 14,320-226-200 unit-mg-unit cap 2 caps daily        Labs:  No results found for this or any previous visit (from the past 72 hour(s)).    I have personally reviewed all pertinent imaging studies and PFT results unless otherwise noted.    Imaging studies:  Ct Low Dose Lung Screening Chest    Result Date: 4/16/2019  EXAM: LOW DOSE LUNG CANCER SCREENING CT CHEST LOCATION: [facility] DATE/TIME: 4/16/2019 10:45  "AM INDICATION: Lung cancer screening. High risk patient with greater than 30 pack year smoking history. COMPARISON: None. TECHNIQUE: Low-dose lung cancer screening noncontrast CT chest. Dose reduction techniques were used. FINDINGS: LUNGS AND PLEURA: Negative screening CT chest. Mild cylindrical bronchiectasis in the lower lobes and some minimal atelectasis in the lower lobes posteriorly. Lungs otherwise clear. No infiltrates. No mucous plugging. A few benign calcified granulomas in the both lungs with some benign calcified right hilar lymph nodes. MEDIASTINUM: Negative. No lymphadenopathy. CORONARY ARTERY CALCIFICATION: Severe. LIMITED UPPER ABDOMEN: Benign-appearing liver lesion left lobe likely cyst. MUSCULOSKELETAL: Negative.     CONCLUSION: 1.  Negative screening CT chest. 2.  Mild cylindrical bronchiectasis in the lower lobes with slight atelectasis. 3.  Benign calcified granulomas. 4.  Severe coronary artery calcifications. RADIOLOGIST RECOMMENDATION: Recommend annual low-dose lung cancer screening CT if clinically appropriate. LungRADS CATEGORY: 2S: Benign. SIGNIFICANT INCIDENTAL FINDINGS: Positive. Severe coronary artery calcifications.        Physical Exam:  /82   Pulse 70   Resp 12   Ht 5' 6.5\" (1.689 m)   Wt 163 lb (73.9 kg)   SpO2 96%   BMI 25.91 kg/m     General - Well nourished  Ears/Mouth -  OP pink moist, no thrush  Neck - no cervical lymphadenopathy  Lungs - Clear to ausculation bilaterally, no crackles or wheezes  CVS - regular rhythm with no murmurs, rubs or gallups  Abdomen - soft, NT, ND, NABS  Ext - no cyanosis, clubbing or edema  Skin - no rash  Psychology - alert and oriented, answers appropriate        Electronically signed by:    Oliver Gray MD  [facility] Pulmonary and Critical Care Medicine       "

## 2021-06-19 NOTE — PROGRESS NOTES
Assessment and Plan:     1. Screening for endocrine, nutritional, metabolic and immunity disorder  - Comprehensive Metabolic Panel  - HM2(CBC w/o Differential)  - Lipid Cascade FASTING  - Thyroid Stimulating Hormone (TSH)  2. Screening for prostate cancer  - PSA, Diagnostic (Prostatic-Specific Antigen)  3. Low libido  Differential diagnosis discussed included low testosterone etc., testosterone level pending.  - Testosterone, Free and Total  - Luteinizing Hormone (LH)  4. Parasomnia, unspecified  Patient will continue to follow with neurologist, was diagnosed about 14 years ago, currently taking Klonopin 2 mg nightly.  5. Encounter for general adult medical examination with abnormal findings  Encouraged patient to eat at least 4 servings of fruits and vegetables a day.  6. Macular degeneration (senile) of retina  Has been followed by ophthalmologist, currently doing A-reds    The patient's current medical problems were reviewed.    I have had an Advance Directives discussion with the patient.  The following health maintenance schedule was reviewed with the patient and provided in printed form in the after visit summary:   Health Maintenance   Topic Date Due     ADVANCE DIRECTIVES DISCUSSED WITH PATIENT  08/27/1967     ZOSTER VACCINE  08/27/2009     PNEUMOCOCCAL POLYSACCHARIDE VACCINE AGE 65 AND OVER  08/27/2014     FALL RISK ASSESSMENT  08/27/2014     COLONOSCOPY  07/11/2019 (Originally 8/27/1999)     INFLUENZA VACCINE RULE BASED (1) 08/01/2018     TD 18+ HE  02/28/2022     PNEUMOCOCCAL CONJUGATE VACCINE FOR ADULTS (PCV13 OR PREVNAR)  Completed        Subjective:   Chief Complaint: You Montano is an 67 y.o. male here for an Annual Wellness visit.   HPI: New patient, has been seen at [facility] in [place] in the past, currently taking Klonopin 2 mg nightly for parasomnia, has been seen a neurologist, also mentioned macular degeneration, taking over-the-counter supplement A-RED, the patient does mention low  "libido, no desire, no sex drive, stable to have an erection but just does not feel the need to have sex most of the time, his wife wanted him to discuss that with his doctor.    Review of Systems:    Please see above.  The rest of the review of systems are negative for all systems.    Patient Care Team:  Vincenzo Malik MD as PCP - General (Family Medicine)     Patient Active Problem List   Diagnosis     Parasomnia, unspecified     Low libido     No past medical history on file.   No past surgical history on file.   No family history on file.   Social History     Social History     Marital status:      Spouse name: N/A     Number of children: N/A     Years of education: N/A     Occupational History     Not on file.     Social History Main Topics     Smoking status: Former Smoker     Smokeless tobacco: Never Used     Alcohol use Not on file     Drug use: Not on file     Sexual activity: Not on file     Other Topics Concern     Not on file     Social History Narrative     No narrative on file      Current Outpatient Prescriptions   Medication Sig Dispense Refill     clonazePAM (KLONOPIN) 2 MG tablet Take 2 mg by mouth.       No current facility-administered medications for this visit.       Objective:   Vital Signs:   Visit Vitals     /68 (Patient Site: Left Arm)     Pulse 68     Temp 97.6  F (36.4  C) (Oral)     Resp 13     Ht 5' 6.5\" (1.689 m)     Wt 158 lb (71.7 kg)     BMI 25.12 kg/m2        VisionScreening:  No exam data present     PHYSICAL EXAM  Physical Exam   Constitutional: He appears well-developed and well-nourished.   HENT:   Right Ear: External ear normal.   Left Ear: External ear normal.   Nose: Nose normal.   Mouth/Throat: Oropharynx is clear and moist.   Eyes: Conjunctivae and EOM are normal. Pupils are equal, round, and reactive to light. Right eye exhibits no discharge. Left eye exhibits no discharge.   Neck: No thyromegaly present.   Cardiovascular: Normal rate, regular rhythm " and normal heart sounds.    No murmur heard.  Pulmonary/Chest: Effort normal and breath sounds normal.   Abdominal: Soft. Bowel sounds are normal. He exhibits no distension and no mass. There is no tenderness. There is no rebound and no guarding.   Genitourinary: Penis normal. Prostate is enlarged (Mildly enlarged, soft, symmetrical with no palpable nodules.). Right testis is descended. Left testis is descended.   Musculoskeletal: Normal range of motion.   Lymphadenopathy:     He has no cervical adenopathy.   Neurological: He is alert. He has normal reflexes.   Skin: Skin is warm and dry. No rash noted.   Psychiatric: He has a normal mood and affect.       Assessment Results 7/10/2018   Activities of Daily Living No help needed   Instrumental Activities of Daily Living No help needed   Mini Cog Total Score 4   Some recent data might be hidden     A Mini-Cog score of 0-2 suggests the possibility of dementia, score of 3-5 suggests no dementia    Identified Health Risks:     The patient was counseled and encouraged to consider modifying their diet and eating habits. He was provided with information on recommended healthy diet options.  The patient was provided with written information regarding signs of hearing loss.  He is at risk for falling and has been provided with information to reduce the risk of falling at home.  Patient's advanced directive was discussed Honoring choices form given to patient.

## 2021-06-19 NOTE — LETTER
Letter by Vincenzo Malik MD at      Author: Vincenzo Malik MD Service: -- Author Type: --    Filed:  Encounter Date: 8/7/2019 Status: (Other)         You Montano  [address] [place] [address]             August 7, 2019         Dear Mr. Montano,    Below are the results from your recent visit:    Resulted Orders   Comprehensive Metabolic Panel   Result Value Ref Range    Sodium 140 136 - 145 mmol/L    Potassium 5.2 (H) 3.5 - 5.0 mmol/L    Chloride 104 98 - 107 mmol/L    CO2 27 22 - 31 mmol/L    Anion Gap, Calculation 9 5 - 18 mmol/L    Glucose 100 70 - 125 mg/dL    BUN 19 8 - 22 mg/dL    Creatinine 1.01 0.70 - 1.30 mg/dL    GFR MDRD Af Amer >60 >60 mL/min/1.73m2    GFR MDRD Non Af Amer >60 >60 mL/min/1.73m2    Bilirubin, Total 0.5 0.0 - 1.0 mg/dL    Calcium 9.8 8.5 - 10.5 mg/dL    Protein, Total 7.1 6.0 - 8.0 g/dL    Albumin 4.3 3.5 - 5.0 g/dL    Alkaline Phosphatase 66 45 - 120 U/L    AST 22 0 - 40 U/L    ALT 17 0 - 45 U/L    Narrative    Fasting Glucose reference range is 70-99 mg/dL per  American Diabetes Association (ADA) guidelines.   Lipid Cascade   Result Value Ref Range    Cholesterol 209 (H) <=199 mg/dL    Triglycerides 101 <=149 mg/dL    HDL Cholesterol 65 >=40 mg/dL    LDL Calculated 124 <=129 mg/dL    Patient Fasting > 8hrs? No    Thyroid Cascade   Result Value Ref Range    TSH 2.03 0.30 - 5.00 uIU/mL   PSA (Prostatic-Specific Antigen), Annual Screen   Result Value Ref Range    PSA 3.5 0.0 - 4.5 ng/mL    Narrative    Method is Abbott Prostate-Specific Antigen (PSA)  Standard-WHO 1st International (90:10)       Total cholesterol was mildly elevated, continue to work on low-fat diet, regular physical activity.  Potassium was just minimally elevated.    Please call with questions or contact us using [employer].    Sincerely,        Electronically signed by Vincenzo Malik MD

## 2021-06-20 NOTE — LETTER
Letter by Maria Guadalupe Moreno RN at      Author: Maria Guadalupe Moreno RN Service: -- Author Type: --    Filed:  Encounter Date: 9/9/2020 Status: (Other)         You Montano  [address] [place] [address]      09/09/20      Dear Sascha,    Your annual lung cancer screening scan is overdue. We have attempted to reach you without success. The lung clinic would be happy to assist you in scheduling an office visit and follow up scan. If you remain interested please call [phone]. Or you may call your primary care provider to schedule an office visit so they can do the screening.  We will make no further attempts to call you to schedule at this point if we do not hear from you and assume you are no longer interested in the screening.     Sincerely,  [facility] ([facility]) Lung Cancer Screening Program

## 2021-06-20 NOTE — LETTER
Letter by Maria Guadalupe Moreno RN at      Author: Maria Guadalupe Moreno RN Service: -- Author Type: --    Filed:  Encounter Date: 7/15/2020 Status: (Other)            You Montano  [address] [place] [address]        July 15, 2020     RE: Reminder about lung cancer screening     Dear You,    Just a reminder, you are due for your yearly exam to check for lung cancer.   Please call us when it is most convenient for you to schedule a clinic appointment before your Low Dose CT scan can be scheduled. We are currently doing virtual patient visits at this time. Your scan needs to be done within 30 days of your office visit.   If you see a physician in the Pulmonary Clinic call [phone] to schedule.     You should have a yearly exam if:  Youre age 55 to 80 (55 to 77, if youre on Medicare)  Youve smoked a pack a day for at least 30 years (or 2 packs a day for at least 15 years)  If you no longer smoke, its been less than 15 years since you quit     These exams work best when done every year. They are good at finding lung cancer early, but they cant find all lung cancers. If you develop any symptoms (shortness of breath, chest pain, coughing up blood), call your doctor right away.     If you would like help to quit smoking, please talk with your doctor during your next visit. Or, call [employer] at [phone].        We look forward to seeing you soon.     Sincerely,     [facility] ([facility]) Lung Cancer Screening Program

## 2021-06-21 NOTE — LETTER
Letter by Vincenzo Malik MD at      Author: Vincenzo Malik MD Service: -- Author Type: --    Filed:  Encounter Date: 12/3/2020 Status: (Other)         You Montano  [address] [place] [address]             December 3, 2020         Dear Mr. Montano,    Below are the results from your recent visit:    Resulted Orders   CT Low Dose Lung Screening Chest    Narrative    EXAM: LOW DOSE LUNG CANCER SCREENING CT CHEST  LOCATION: [facility]  DATE/TIME: 11/29/2020 10:54 AM    INDICATION: Lung cancer screening. History of smoking. High risk patient with greater than 30 pack year smoking history.  COMPARISON: 04/16/2019.    TECHNIQUE: Low-dose lung cancer screening non-contrast CT chest. Dose reduction techniques were used.     FINDINGS:  NODULES: Calcified granulomas in both lungs. No noncalcified or suspicious nodules or masses.    LUNGS AND PLEURA: Mild emphysematous change. Cylindrical bronchiectasis in both lower lobes and the right middle lobe with mild bronchial thickening. No mucus plugging or consolidation. Bandlike atelectasis or scarring in the right lower lobe is stable.    MEDIASTINUM: No lymphadenopathy. Normal heart size no pericardial effusion no thoracic aortic aneurysm there is calcification of the descending thoracic aorta.    CORONARY ARTERY CALCIFICATION: Extensive    LIMITED UPPER ABDOMEN: Normal.    MUSCULOSKELETAL: Mild degenerative change in the thoracic spine. No acute fractures or suspicious bony lesions.      Impression    1.  Negative screening chest CT.  2.  Cylindrical bronchiectasis and atelectasis or scarring at the lung bases is stable.  3.  Severe coronary artery calcification.    LungRADS CATEGORY: 1S: Negative. Severe coronary artery calcification.    RADIOLOGIST RECOMMENDATION: Continue annual screening with low-dose CT chest in 12 months .       Lung CT scan did show stability of the fibrosis, still showing some coronary  calcification, hopefully the cholesterol medication will prevent further plaque buildup.  Repeat CT scan in 1 year.    Please call with questions or contact us using [employer].    Sincerely,        Electronically signed by Vincenzo Malik MD

## 2021-06-21 NOTE — LETTER
Letter by Vincenzo Malik MD at      Author: Vincenzo Malik MD Service: -- Author Type: --    Filed:  Encounter Date: 11/5/2020 Status: (Other)       My COPD Action Plan     Name: You Montano    YOB: 1950   Date: 11/6/2020    My doctor: Vincenzo Malik MD   My clinic: [address] [address]  [phone]  My Controller Medicine: Formoterol/Budesonide (Symbicort)   Dose: 2 puff bid     My Rescue Medicine: Albuterol (Proair/Ventolin/Proventil) inhaler   Dose: 2 puff q4 prn     My Flare Up Medicine: Prednisone   Dose: call for dose  My COPD Severity: Severe = FeV1 < 30%-49%      Use of Oxygen: Oxygen Not Prescribed      Make sure you've had your pneumonia   vaccines.          GREEN ZONE       Doing well today      Usual level of activity and exercise    Usual amount of cough and mucus    No shortness of breath    Usual level of health (thinking clearly, sleeping well, feel like eating) Actions:      Take daily medicines    Use oxygen as prescribed    Follow regular exercise and diet plan    Avoid cigarette smoke and other irritants that harm the lungs              YELLOW ZONE             Having a bad day or flare up      Short of breath more than usual    A lot more sputum (mucus) than usual    Sputum looks yellow, green, tan, brown or bloody    More coughing or wheezing    Fever or chills    Less energy; trouble completing activities    Trouble thinking or focusing    Using quick relief inhaler or nebulizer more often    Poor sleep; symptoms wake me up    Do not feel like eating Actions:      Get plenty of rest    Take daily medicines    Use quick relief inhaler every 2 hours    If you use oxygen, call you doctor to see if you should adjust your oxygen    Do breathing exercises or other things to help you relax    Let a loved one, friend or neighbor know you are feeling worse    Call your care team if you have 2 or  more symptoms.  Start taking steroids or antibiotics if directed by your care team              RED ZONE           Need medical care now      Severe shortness of breath (feel you can't breathe)    Fever, chills    Not enough breath to do any activity    Trouble coughing up mucus, walking or talking    Blood in mucus    Frequent coughing   Rescue medicines are not working    Not able to sleep because of breathing    Feel confused or drowsy    Chest pain    Actions:      Call your health care team.  If you cannot reach your care team, call 911 or go to the emergency room.           Annual Reminders:  Meet with Care Team, Flu Shot every Fall  Pharmacy:   [facility] - [place], [place] - [address]  [address] [address]  Phone: [phone] Fax: [phone]

## 2021-06-21 NOTE — LETTER
Letter by Vincenzo Malik MD at      Author: Vincenzo Malik MD Service: -- Author Type: --    Filed:  Encounter Date: 11/6/2020 Status: (Other)         You Montano  [address] [place] [address]             November 6, 2020         Dear Mr. Montano,    Below are the results from your recent visit:    Resulted Orders   HM2(CBC w/o Differential)   Result Value Ref Range    WBC 5.2 4.0 - 11.0 thou/uL    RBC 4.78 4.40 - 6.20 mill/uL    Hemoglobin 14.9 14.0 - 18.0 g/dL    Hematocrit 44.6 40.0 - 54.0 %    MCV 93 80 - 100 fL    MCH 31.2 27.0 - 34.0 pg    MCHC 33.4 32.0 - 36.0 g/dL    RDW 12.2 11.0 - 14.5 %    Platelets 215 140 - 440 thou/uL    MPV 8.1 7.0 - 10.0 fL   Comprehensive Metabolic Panel   Result Value Ref Range    Sodium 141 136 - 145 mmol/L    Potassium 5.3 (H) 3.5 - 5.0 mmol/L    Chloride 105 98 - 107 mmol/L    CO2 26 22 - 31 mmol/L    Anion Gap, Calculation 10 5 - 18 mmol/L    Glucose 110 70 - 125 mg/dL    BUN 21 8 - 28 mg/dL    Creatinine 0.94 0.70 - 1.30 mg/dL    GFR MDRD Af Amer >60 >60 mL/min/1.73m2    GFR MDRD Non Af Amer >60 >60 mL/min/1.73m2    Bilirubin, Total 0.4 0.0 - 1.0 mg/dL    Calcium 9.6 8.5 - 10.5 mg/dL    Protein, Total 7.0 6.0 - 8.0 g/dL    Albumin 4.0 3.5 - 5.0 g/dL    Alkaline Phosphatase 89 45 - 120 U/L    AST 20 0 - 40 U/L    ALT 15 0 - 45 U/L    Narrative    Fasting Glucose reference range is 70-99 mg/dL per  American Diabetes Association (ADA) guidelines.   Lipid Cascade   Result Value Ref Range    Cholesterol 202 (H) <=199 mg/dL    Triglycerides 96 <=149 mg/dL    HDL Cholesterol 57 >=40 mg/dL    LDL Calculated 126 <=129 mg/dL    Patient Fasting > 8hrs? No    Thyroid Cascade   Result Value Ref Range    TSH 0.99 0.30 - 5.00 uIU/mL   Hepatitis C Antibody (Anti-HCV)   Result Value Ref Range    Hepatitis C Ab Negative Negative   PSA (Prostatic-Specific Antigen), Annual Screen   Result Value Ref Range    PSA 3.5 0.0 - 6.5 ng/mL    Narrative    Method is  Abbott Prostate-Specific Antigen (PSA)  Standard-WHO 1st International (90:10)       Liver, kidneys,  and prostate were all within normal limit.  Potassium was just mildly elevated, continue to monitor over time  Please call with questions or contact us using [email or web address].    Sincerely,        Electronically signed by Vincenzo Malik MD

## 2021-06-22 NOTE — TELEPHONE ENCOUNTER
I did put a referral to see a pulmonologist, but I would also suggest that in the meantime he makes an appointment to be seen at the clinic, we could start by doing chest x-ray, listening to his lungs etc.  If His symptoms get worse then he must go to the emergency room.

## 2021-06-22 NOTE — TELEPHONE ENCOUNTER
2ND request !!!!!!  See below messages      Referral Request  Type of referral: Pulmonologist  Who s requesting: Patient  Why the request: Shortness of breath, not improving over the last 6 months - worse with significant movement. Patient has not seen a pulmonologist before.  Have you been seen for this request: No:  Appointment Offered:  declined  Does patient have a preference on a group/provider? No preference  Okay to leave a detailed message?  Yes

## 2021-06-23 NOTE — PATIENT INSTRUCTIONS - HE
For nocturnal reflux aspiration (causing morning cough and shortness of breath)  - zantac nightly  - avoid food or drink within 3 hours of bedtime  - avoid trigger foods (alcohol, caffeine, spicy foods)  - elevating head of bed    For COPD  - start low-dose Symbicort twice daily  - cont albuterol inhaler with spacer as needed    Lung cancer screening CT    Return in 2 months to follow-up

## 2021-06-23 NOTE — PROGRESS NOTES
Assessment and Plan:You Montano is a 68 y.o. with a past medical history significant for a 40-pack-year smoking history, having quit 5-10 years ago, who presents to clinic today for shortness of breath and a cough productive for whitish sputum primarily in the mornings.  He reports occasional heartburn and sleeps flat at night.  He has used a CPAP from [facility] for sleep apnea for the past 15 years and recently had it checked and adjusted.  Until coming to our clinic he had never previously had pulmonary function testing or been formally diagnosed with COPD.  He was recently started a on a as needed albuterol inhaler with a spacer that he uses on average 1-2 times per week and does experience some benefit.    1/29/2019 PFTs: Consistent with moderate obstruction, FEV1 40% of predicted.  Diffusing capacity was decreased at 47% of predicted.  RV was 172% of predicted showing air trapping    1) For nocturnal reflux aspiration (likely causing morning cough and shortness of breath)  - zantac nightly  - avoid food or drink within 3 hours of bedtime  - avoid trigger foods (alcohol, caffeine, spicy foods)  - elevating head of bed    2) For moderate COPD  - start low-dose Symbicort twice daily  - cont albuterol inhaler with spacer as needed  - he exercises regularly and is able to walk a mile and a half every other day without difficulty.  Pulmonary rehab was discussed however he does not feel the need for it at this time, but may consider it in the future.    3) Lung cancer screening CT    Return in 2 months to follow-up    Lung Cancer Screening pre-scan counseling Visit    The patient fits the risk profile of patients who benefit from this screening:  -The patient is >55 years old and <80 years old  -The patient has 40 pack year history (over 30)  -The patient has smoked within the past 15 years  -The patient has no medical comorbidity severe enough that it would cause mortality prior to mortality due to the lung  cancer attempting to be detected.    Discussion with patient regarding the harms associated with LDCT screening include false-negative and false-positive results, incidental findings, overdiagnosis, and radiation exposure were reviewed at length.   The patient understands that pursuing this screening test may result in a biopsy that was not necessary. It may also produced added stress over a nodule that is likely not cancer.    Of 100 patients who get screening, 25 will have a positive scan. Of those 25, only 1 will have cancer.  Overdiagnosis is estimated at 10% of patients-- they would not have been detected in the patient's lifetime without screening. Less than 1% of patients likely had death related to radiation exposure increase.   Average low-dose CT associated with 0.61 to 1.5 mSv. Annual background radiation exposure in the [place] averages 2.4 mS; mammogram is 0.7mSv.    The benefits are reduction in risk of death from lung cancer. The number needed to treat is 320 (for every 320 patients who undergo screening, 1 patient will have a benefit in mortality from early detection from the screening).    Undergoing this screening implies willingness to pursue further potentially invasive testing to discover potential cancer.    All questions were answered.    The patient s results will be followed in our pulmonary registry and will be recalled based on the findings of the CT scan.    CCx: Morning cough and shortness of breath    HPI: Mr. Montano is a pleasant 68-year-old gentleman reports shortness of breath and a cough productive for whitish sputum primarily in the mornings.  He reports occasional heartburn and sleeps flat at night.  He has used a CPAP from [facility] for sleep apnea for the past 15 years and recently had it checked and adjusted.  Until coming to our clinic he had never previously had pulmonary function testing or been formally diagnosed with COPD.  He was recently started a on a as  needed albuterol inhaler with a spacer that he uses on average 1-2 times per week and does experience some benefit.      PMH:  Sleep apnea  Heartburn  Low libido    PSH:  No past surgical history on file.    SH:  Social History     Socioeconomic History     Marital status:      Spouse name: Not on file     Number of children: Not on file     Years of education: Not on file     Highest education level: Not on file   Social Needs     Financial resource strain: Not on file     Food insecurity - worry: Not on file     Food insecurity - inability: Not on file     Transportation needs - medical: Not on file     Transportation needs - non-medical: Not on file   Occupational History     Not on file   Tobacco Use     Smoking status: Former Smoker     Smokeless tobacco: Never Used   Substance and Sexual Activity     Alcohol use: Not on file     Drug use: Not on file     Sexual activity: Not on file   Other Topics Concern     Not on file   Social History Narrative     Not on file   Smoked 1 pack/day for 40 years, quit 5-10 years ago.  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    ROS:  A review of 12 organ systems was performed with pertinent positives and negatives noted in the HPI.    Current Meds:  Current Outpatient Medications   Medication Sig Note     albuterol (PROAIR HFA;PROVENTIL HFA;VENTOLIN HFA) 90 mcg/actuation inhaler Inhale 2 puffs every 6 (six) hours as needed for wheezing.      budesonide-formoterol (SYMBICORT) 80-4.5 mcg/actuation inhaler Inhale 2 puffs 2 (two) times a day.      clonazePAM (KLONOPIN) 2 MG tablet Take 2 mg by mouth. 7/10/2018: Received from: [facility] Received Sig: Take 1 tablet by mouth at bedtime.     vitamins   "A,C,E-zinc-copper [id number] unit-mg-unit cap 2 caps daily        Labs:  No results found for this or any previous visit (from the past 72 hour(s)).    I have personally reviewed all imaging and PFT data available pertinent to this visit.      Physical Exam:  /80   Pulse 80   Ht 5' 6.5\" (1.689 m)   Wt 158 lb (71.7 kg)   SpO2 95%   BMI 25.12 kg/m    General - Well nourished  Ears/Mouth - OP pink moist, no thrush  Neck - no cervical lymphadenopathy  Lungs - Clear to ausculation bilaterally   CVS - regular rhythm with no murmurs, rubs or gallups  Abdomen - soft, NT, ND, NABS  Ext - no cyanosis, clubbing or edema  Skin - no rash  Psychology - alert and oriented, answers appropriate        Electronically signed by:    Oliver Gray MD  [facility]  "

## 2021-09-08 DIAGNOSIS — J44.9 CHRONIC OBSTRUCTIVE PULMONARY DISEASE, UNSPECIFIED COPD TYPE (H): Primary | ICD-10-CM

## 2021-09-08 RX ORDER — IPRATROPIUM BROMIDE AND ALBUTEROL SULFATE 2.5; .5 MG/3ML; MG/3ML
SOLUTION RESPIRATORY (INHALATION)
Qty: 180 ML | Refills: 0 | Status: SHIPPED | OUTPATIENT
Start: 2021-09-08 | End: 2021-12-17

## 2021-09-08 NOTE — TELEPHONE ENCOUNTER
"Routing refill request to provider for review/approval because:  Drug not active on patient's medication list  Last Written Prescription Date:???  Last Fill Quantity: ???,  # refills: ???   Last office visit provider:11/5/2020      Requested Prescriptions   Pending Prescriptions Disp Refills     ipratropium - albuterol 0.5 mg/2.5 mg/3 mL (DUONEB) 0.5-2.5 (3) MG/3ML neb solution [Pharmacy Med Name: IPRATROPIUM BROMIDE/ALBUTEROL SULFATE 0.5-2.5 (3) MG/3ML Solution] 180 mL      Sig: INHALE THE CONTENTS OF ONE VIAL VIA NEBULIZER EVERY 6 HOURS AS NEEDED       Short-Acting Beta Agonist Inhalers Protocol  Failed - 9/8/2021 12:45 PM        Failed - Medication is active on med list        Passed - Patient is age 12 or older        Passed - Recent (12 mo) or future (30 days) visit within the authorizing provider's specialty     Patient has had an office visit with the authorizing provider or a provider within the authorizing providers department within the previous 12 mos or has a future within next 30 days. See \"Patient Info\" tab in inbasket, or \"Choose Columns\" in Meds & Orders section of the refill encounter.             Asthma Nebs Protocol Failed - 9/8/2021 12:45 PM        Failed - Medication is active on med list        Passed - Patient is age 4 years or older        Passed - Recent (12 mo) or future (30 days) visit within the authorizing provider's specialty     Patient has had an office visit with the authorizing provider or a provider within the authorizing providers department within the previous 12 mos or has a future within next 30 days. See \"Patient Info\" tab in inbasket, or \"Choose Columns\" in Meds & Orders section of the refill encounter.                   Ang Anguiano MA 09/08/21 12:45 PM  "

## 2021-11-09 ENCOUNTER — TELEPHONE (OUTPATIENT)
Dept: FAMILY MEDICINE | Facility: CLINIC | Age: 71
End: 2021-11-09
Payer: COMMERCIAL

## 2021-11-09 DIAGNOSIS — J44.9 CHRONIC OBSTRUCTIVE PULMONARY DISEASE, UNSPECIFIED COPD TYPE (H): Primary | ICD-10-CM

## 2021-11-09 NOTE — TELEPHONE ENCOUNTER
Central Prior Authorization Team   Phone: [phone]    PA Initiation    Medication: budesonide-formoteroL (SYMBICORT) 80-4.5 mcg/actuation inhaler  Insurance Company: [email or web address] - Phone [phone] Fax [phone]  Pharmacy Filling the Rx: [email or web address] [facility] - [address]  Filling Pharmacy Phone: [phone]  Filling Pharmacy Fax: [phone]  Start Date: 11/9/2021    I received a phone a [employer] Rep named Shavonne (Case ID [id number]). The patient started their own Prior Authorization and she was collecting additional information for the request. A determination will be issued within 24-72 hours.

## 2021-11-10 DIAGNOSIS — J44.9 CHRONIC OBSTRUCTIVE PULMONARY DISEASE, UNSPECIFIED COPD TYPE (H): ICD-10-CM

## 2021-11-10 DIAGNOSIS — K21.9 GERD (GASTROESOPHAGEAL REFLUX DISEASE): ICD-10-CM

## 2021-11-10 NOTE — TELEPHONE ENCOUNTER
"PRIOR AUTHORIZATION/TIER EXCEPTION DENIED    Medication: budesonide-formoteroL (SYMBICORT) 80-4.5 mcg/actuation inhaler-Denied     Denial Date: 11/9/2021    Denial Rational: In order for Tier Exceptions to be approved, there needs to a name brand drug in a lower tier that treats the patient's condition; therefore it can not be approved per Medicare guidelines (please note also \"branded generic\" drugs are considered generic drugs by Medicare)        Appeal Information: If the provider would like to appeal this denial, please provide a letter of medical necessity. Please also include any therapies that the patient has tried and their outcomes. The patient's insurance company will also require the provider to address why the insurance preferred options are not appropriate in the patient's therapy.  The reason could be that the preferred options will harm the patient; either physically or mentally. They are contraindicated to the patient; or the patient has already been taking the requested medication and changing the therapy would change the outcome of their therapy.    Once it has been completed and placed in the patient's chart, notify the Central PA Team ([facility]) and the appeal can be initiated on behalf of the patient and provider.                  "

## 2021-11-11 RX ORDER — MOMETASONE FUROATE AND FORMOTEROL FUMARATE DIHYDRATE 100; 5 UG/1; UG/1
AEROSOL RESPIRATORY (INHALATION)
Refills: 3 | OUTPATIENT
Start: 2021-11-11

## 2021-11-11 RX ORDER — FAMOTIDINE 40 MG/1
TABLET, FILM COATED ORAL
Qty: 90 TABLET | Refills: 0 | Status: SHIPPED | OUTPATIENT
Start: 2021-11-11

## 2021-11-11 NOTE — TELEPHONE ENCOUNTER
Prescription denied, duplicate. Filled on 11/10/21.    Dora Livingston, PharmD  Medication Therapy Management Pharmacist  [phone]

## 2021-11-11 NOTE — TELEPHONE ENCOUNTER
"Last Written Prescription Date:  11/5/20  Last Fill Quantity: 90,  # refills: 3   Last office visit provider:  Vincenzo Malik     Requested Prescriptions   Pending Prescriptions Disp Refills     famotidine (PEPCID) 40 MG tablet [Pharmacy Med Name: FAMOTIDINE 40 MG Tablet] 90 tablet 3     Sig: TAKE 1 TABLET BY MOUTH DAILY.       H2 Blockers Protocol Passed - 11/10/2021  6:10 PM        Passed - Patient is age 12 or older        Passed - Recent (12 mo) or future (30 days) visit within the authorizing provider's specialty     Patient has had an office visit with the authorizing provider or a provider within the authorizing providers department within the previous 12 mos or has a future within next 30 days. See \"Patient Info\" tab in inbasket, or \"Choose Columns\" in Meds & Orders section of the refill encounter.              Passed - Medication is active on med list             Mary Livingston RPH 11/11/21 3:26 PM    "

## 2021-12-13 DIAGNOSIS — J44.9 CHRONIC OBSTRUCTIVE PULMONARY DISEASE, UNSPECIFIED COPD TYPE (H): ICD-10-CM

## 2021-12-15 NOTE — TELEPHONE ENCOUNTER
"Routing refill request to provider for review/approval because:  Patient needs to be seen because it has been more than 1 year since last office visit.    Last Written Prescription Date:  9/8/21  Last Fill Quantity: 180ml,  # refills: 0   Last office visit provider: 12/10/2020      Requested Prescriptions   Pending Prescriptions Disp Refills     ipratropium - albuterol 0.5 mg/2.5 mg/3 mL (DUONEB) 0.5-2.5 (3) MG/3ML neb solution [Pharmacy Med Name: IPRATROPIUM BROMIDE/ALBUTEROL SULFATE 0.5-2.5 (3) MG/3ML Solution] 180 mL 0     Sig: INHALE THE CONTENTS OF ONE VIAL VIA NEBULIZER EVERY 6 HOURS AS NEEDED       Short-Acting Beta Agonist Inhalers Protocol  Failed - 12/13/2021  9:31 AM        Failed - Recent (12 mo) or future (30 days) visit within the authorizing provider's specialty     Patient has had an office visit with the authorizing provider or a provider within the authorizing providers department within the previous 12 mos or has a future within next 30 days. See \"Patient Info\" tab in inbasket, or \"Choose Columns\" in Meds & Orders section of the refill encounter.              Passed - Patient is age 12 or older        Passed - Medication is active on med list       Asthma Nebs Protocol Failed - 12/13/2021  9:31 AM        Failed - Recent (12 mo) or future (30 days) visit within the authorizing provider's specialty     Patient has had an office visit with the authorizing provider or a provider within the authorizing providers department within the previous 12 mos or has a future within next 30 days. See \"Patient Info\" tab in inbasket, or \"Choose Columns\" in Meds & Orders section of the refill encounter.              Passed - Patient is age 4 years or older        Passed - Medication is active on med list             Lizet Smyth RN 12/15/21 8:58 AM  "

## 2021-12-17 RX ORDER — IPRATROPIUM BROMIDE AND ALBUTEROL SULFATE 2.5; .5 MG/3ML; MG/3ML
SOLUTION RESPIRATORY (INHALATION)
Qty: 180 ML | Refills: 0 | Status: SHIPPED | OUTPATIENT
Start: 2021-12-17 | End: 2022-01-06

## 2022-01-03 DIAGNOSIS — J44.9 CHRONIC OBSTRUCTIVE PULMONARY DISEASE, UNSPECIFIED COPD TYPE (H): ICD-10-CM

## 2022-01-06 RX ORDER — IPRATROPIUM BROMIDE AND ALBUTEROL SULFATE 2.5; .5 MG/3ML; MG/3ML
SOLUTION RESPIRATORY (INHALATION)
Qty: 180 ML | Refills: 0 | Status: SHIPPED | OUTPATIENT
Start: 2022-01-06 | End: 2022-03-08

## 2022-01-06 NOTE — TELEPHONE ENCOUNTER
"Routing refill request to provider for review/approval because:  Patient needs to be seen because it has been more than 1 year since last office visit.    Last Written Prescription Date:  12/17/21  Last Fill Quantity: 180 mL,  # refills: 0   Last office visit provider:  12/10/20     Requested Prescriptions   Pending Prescriptions Disp Refills     ipratropium - albuterol 0.5 mg/2.5 mg/3 mL (DUONEB) 0.5-2.5 (3) MG/3ML neb solution [Pharmacy Med Name: IPRATROPIUM BROMIDE/ALBUTEROL SULFATE 0.5-2.5 (3) MG/3ML Solution] 180 mL 0     Sig: INHALE THE CONTENTS OF ONE VIAL VIA NEBULIZER EVERY 6 HOURS AS NEEDED       Short-Acting Beta Agonist Inhalers Protocol  Failed - 1/3/2022  4:25 PM        Failed - Recent (12 mo) or future (30 days) visit within the authorizing provider's specialty     Patient has had an office visit with the authorizing provider or a provider within the authorizing providers department within the previous 12 mos or has a future within next 30 days. See \"Patient Info\" tab in inbasket, or \"Choose Columns\" in Meds & Orders section of the refill encounter.              Passed - Patient is age 12 or older        Passed - Medication is active on med list       Asthma Nebs Protocol Failed - 1/3/2022  4:25 PM        Failed - Recent (12 mo) or future (30 days) visit within the authorizing provider's specialty     Patient has had an office visit with the authorizing provider or a provider within the authorizing providers department within the previous 12 mos or has a future within next 30 days. See \"Patient Info\" tab in inbasket, or \"Choose Columns\" in Meds & Orders section of the refill encounter.              Passed - Patient is age 4 years or older        Passed - Medication is active on med list             sandra grossman RN 01/05/22 8:50 PM  "

## 2022-03-07 DIAGNOSIS — J44.9 CHRONIC OBSTRUCTIVE PULMONARY DISEASE, UNSPECIFIED COPD TYPE (H): ICD-10-CM

## 2022-03-08 RX ORDER — IPRATROPIUM BROMIDE AND ALBUTEROL SULFATE 2.5; .5 MG/3ML; MG/3ML
SOLUTION RESPIRATORY (INHALATION)
Qty: 180 ML | Refills: 0 | Status: SHIPPED | OUTPATIENT
Start: 2022-03-08 | End: 2022-04-27

## 2022-03-08 NOTE — TELEPHONE ENCOUNTER
"Routing refill request to provider for review/approval because:  Patient needs to be seen because it has been more than 1 year since last office visit.    Last Written Prescription Date:  1/6/22  Last Fill Quantity: 180 ml,  # refills: 0   Last office visit provider:  12/10/20     Requested Prescriptions   Pending Prescriptions Disp Refills     ipratropium - albuterol 0.5 mg/2.5 mg/3 mL (DUONEB) 0.5-2.5 (3) MG/3ML neb solution [Pharmacy Med Name: IPRATROPIUM BROMIDE/ALBUTEROL SULFATE 0.5-2.5 (3) MG/3ML Solution] 180 mL 0     Sig: INHALE THE CONTENTS OF ONE VIAL VIA NEBULIZER EVERY 6 HOURS AS NEEDED       Short-Acting Beta Agonist Inhalers Protocol  Failed - 3/8/2022  3:24 PM        Failed - Recent (12 mo) or future (30 days) visit within the authorizing provider's specialty     Patient has had an office visit with the authorizing provider or a provider within the authorizing providers department within the previous 12 mos or has a future within next 30 days. See \"Patient Info\" tab in inbasket, or \"Choose Columns\" in Meds & Orders section of the refill encounter.              Passed - Patient is age 12 or older        Passed - Medication is active on med list       Asthma Nebs Protocol Failed - 3/8/2022  3:24 PM        Failed - Recent (12 mo) or future (30 days) visit within the authorizing provider's specialty     Patient has had an office visit with the authorizing provider or a provider within the authorizing providers department within the previous 12 mos or has a future within next 30 days. See \"Patient Info\" tab in inbasket, or \"Choose Columns\" in Meds & Orders section of the refill encounter.              Passed - Patient is age 4 years or older        Passed - Medication is active on med list             Buzz Varma RN 03/08/22 3:24 PM  "

## 2022-04-24 DIAGNOSIS — J44.9 CHRONIC OBSTRUCTIVE PULMONARY DISEASE, UNSPECIFIED COPD TYPE (H): ICD-10-CM

## 2022-04-27 RX ORDER — IPRATROPIUM BROMIDE AND ALBUTEROL SULFATE 2.5; .5 MG/3ML; MG/3ML
SOLUTION RESPIRATORY (INHALATION)
Qty: 180 ML | Refills: 0 | Status: SHIPPED | OUTPATIENT
Start: 2022-04-27

## 2022-04-27 NOTE — TELEPHONE ENCOUNTER
Routing refill request to provider for review/approval because:  --Based on visit history patient may be going to an [facility] clinic now.  --Last visit: At [facility] looks [facility] and [facility] since.    --Future Visit: none.

## 2022-06-26 ENCOUNTER — HOSPITAL ENCOUNTER (INPATIENT)
Facility: CLINIC | Age: 72
LOS: 1 days | Discharge: OTHER INSTITUTION | DRG: 444 | End: 2022-06-27
Attending: EMERGENCY MEDICINE
Payer: COMMERCIAL

## 2022-06-26 ENCOUNTER — APPOINTMENT (OUTPATIENT)
Dept: ULTRASOUND IMAGING | Facility: CLINIC | Age: 72
DRG: 444 | End: 2022-06-26
Attending: EMERGENCY MEDICINE
Payer: COMMERCIAL

## 2022-06-26 ENCOUNTER — APPOINTMENT (OUTPATIENT)
Dept: RADIOLOGY | Facility: CLINIC | Age: 72
DRG: 444 | End: 2022-06-26
Attending: EMERGENCY MEDICINE
Payer: COMMERCIAL

## 2022-06-26 DIAGNOSIS — U07.1 COVID-19: ICD-10-CM

## 2022-06-26 DIAGNOSIS — K83.09 CHOLANGITIS (H): ICD-10-CM

## 2022-06-26 DIAGNOSIS — K83.1 BILIARY OBSTRUCTION (H): ICD-10-CM

## 2022-06-26 LAB
ALBUMIN SERPL-MCNC: 2.7 G/DL (ref 3.5–5)
ALBUMIN UR-MCNC: 30 MG/DL
ALP SERPL-CCNC: 334 U/L (ref 45–120)
ALT SERPL W P-5'-P-CCNC: 228 U/L (ref 0–45)
ANION GAP SERPL CALCULATED.3IONS-SCNC: 11 MMOL/L (ref 5–18)
APPEARANCE UR: CLEAR
AST SERPL W P-5'-P-CCNC: 160 U/L (ref 0–40)
BASOPHILS # BLD AUTO: 0 10E3/UL (ref 0–0.2)
BASOPHILS NFR BLD AUTO: 0 %
BILIRUB DIRECT SERPL-MCNC: 4.3 MG/DL
BILIRUB SERPL-MCNC: 7.3 MG/DL (ref 0–1)
BILIRUB UR QL STRIP: ABNORMAL
BUN SERPL-MCNC: 16 MG/DL (ref 8–28)
CALCIUM SERPL-MCNC: 8.5 MG/DL (ref 8.5–10.5)
CHLORIDE BLD-SCNC: 104 MMOL/L (ref 98–107)
CO2 SERPL-SCNC: 21 MMOL/L (ref 22–31)
COLOR UR AUTO: ABNORMAL
CREAT SERPL-MCNC: 0.79 MG/DL (ref 0.7–1.3)
EOSINOPHIL # BLD AUTO: 0 10E3/UL (ref 0–0.7)
EOSINOPHIL NFR BLD AUTO: 0 %
ERYTHROCYTE [DISTWIDTH] IN BLOOD BY AUTOMATED COUNT: 13.9 % (ref 10–15)
FLUAV RNA SPEC QL NAA+PROBE: NEGATIVE
FLUBV RNA RESP QL NAA+PROBE: NEGATIVE
GFR SERPL CREATININE-BSD FRML MDRD: >90 ML/MIN/1.73M2
GLUCOSE BLD-MCNC: 151 MG/DL (ref 70–125)
GLUCOSE UR STRIP-MCNC: NEGATIVE MG/DL
HCT VFR BLD AUTO: 38.2 % (ref 40–53)
HGB BLD-MCNC: 12.9 G/DL (ref 13.3–17.7)
HGB UR QL STRIP: NEGATIVE
IMM GRANULOCYTES # BLD: 0 10E3/UL
IMM GRANULOCYTES NFR BLD: 1 %
KETONES UR STRIP-MCNC: NEGATIVE MG/DL
LEUKOCYTE ESTERASE UR QL STRIP: NEGATIVE
LIPASE SERPL-CCNC: 130 U/L (ref 0–52)
LYMPHOCYTES # BLD AUTO: 0.4 10E3/UL (ref 0.8–5.3)
LYMPHOCYTES NFR BLD AUTO: 5 %
MCH RBC QN AUTO: 30.4 PG (ref 26.5–33)
MCHC RBC AUTO-ENTMCNC: 33.8 G/DL (ref 31.5–36.5)
MCV RBC AUTO: 90 FL (ref 78–100)
MONOCYTES # BLD AUTO: 0.5 10E3/UL (ref 0–1.3)
MONOCYTES NFR BLD AUTO: 8 %
MUCOUS THREADS #/AREA URNS LPF: PRESENT /LPF
NEUTROPHILS # BLD AUTO: 5.8 10E3/UL (ref 1.6–8.3)
NEUTROPHILS NFR BLD AUTO: 86 %
NITRATE UR QL: NEGATIVE
NRBC # BLD AUTO: 0 10E3/UL
NRBC BLD AUTO-RTO: 0 /100
PH UR STRIP: 6 [PH] (ref 5–7)
PLATELET # BLD AUTO: 118 10E3/UL (ref 150–450)
POTASSIUM BLD-SCNC: 3.3 MMOL/L (ref 3.5–5)
PROT SERPL-MCNC: 6.4 G/DL (ref 6–8)
RBC # BLD AUTO: 4.24 10E6/UL (ref 4.4–5.9)
RBC URINE: 2 /HPF
RSV RNA SPEC NAA+PROBE: NEGATIVE
SARS-COV-2 RNA RESP QL NAA+PROBE: POSITIVE
SODIUM SERPL-SCNC: 136 MMOL/L (ref 136–145)
SP GR UR STRIP: 1.02 (ref 1–1.03)
SQUAMOUS EPITHELIAL: <1 /HPF
UROBILINOGEN UR STRIP-MCNC: 4 MG/DL
WBC # BLD AUTO: 6.8 10E3/UL (ref 4–11)
WBC URINE: 4 /HPF

## 2022-06-26 PROCEDURE — 999N000157 HC STATISTIC RCP TIME EA 10 MIN

## 2022-06-26 PROCEDURE — 999N000054 HC STATISTIC EKG NON-CHARGEABLE

## 2022-06-26 PROCEDURE — 94640 AIRWAY INHALATION TREATMENT: CPT

## 2022-06-26 PROCEDURE — 258N000003 HC RX IP 258 OP 636: Performed by: EMERGENCY MEDICINE

## 2022-06-26 PROCEDURE — 87637 SARSCOV2&INF A&B&RSV AMP PRB: CPT | Performed by: EMERGENCY MEDICINE

## 2022-06-26 PROCEDURE — 99285 EMERGENCY DEPT VISIT HI MDM: CPT

## 2022-06-26 PROCEDURE — 36415 COLL VENOUS BLD VENIPUNCTURE: CPT | Performed by: EMERGENCY MEDICINE

## 2022-06-26 PROCEDURE — 250N000011 HC RX IP 250 OP 636: Performed by: EMERGENCY MEDICINE

## 2022-06-26 PROCEDURE — 120N000001 HC R&B MED SURG/OB

## 2022-06-26 PROCEDURE — 87040 BLOOD CULTURE FOR BACTERIA: CPT | Performed by: EMERGENCY MEDICINE

## 2022-06-26 PROCEDURE — 76705 ECHO EXAM OF ABDOMEN: CPT

## 2022-06-26 PROCEDURE — 96375 TX/PRO/DX INJ NEW DRUG ADDON: CPT

## 2022-06-26 PROCEDURE — 99222 1ST HOSP IP/OBS MODERATE 55: CPT | Mod: AI | Performed by: EMERGENCY MEDICINE

## 2022-06-26 PROCEDURE — 250N000013 HC RX MED GY IP 250 OP 250 PS 637: Performed by: EMERGENCY MEDICINE

## 2022-06-26 PROCEDURE — 250N000009 HC RX 250: Performed by: EMERGENCY MEDICINE

## 2022-06-26 PROCEDURE — 85025 COMPLETE CBC W/AUTO DIFF WBC: CPT | Performed by: EMERGENCY MEDICINE

## 2022-06-26 PROCEDURE — 82248 BILIRUBIN DIRECT: CPT | Performed by: EMERGENCY MEDICINE

## 2022-06-26 PROCEDURE — 93005 ELECTROCARDIOGRAM TRACING: CPT | Performed by: EMERGENCY MEDICINE

## 2022-06-26 PROCEDURE — 94660 CPAP INITIATION&MGMT: CPT

## 2022-06-26 PROCEDURE — C9803 HOPD COVID-19 SPEC COLLECT: HCPCS

## 2022-06-26 PROCEDURE — 96374 THER/PROPH/DIAG INJ IV PUSH: CPT

## 2022-06-26 PROCEDURE — 93010 ELECTROCARDIOGRAM REPORT: CPT | Performed by: INTERNAL MEDICINE

## 2022-06-26 PROCEDURE — 83690 ASSAY OF LIPASE: CPT | Performed by: EMERGENCY MEDICINE

## 2022-06-26 PROCEDURE — 81001 URINALYSIS AUTO W/SCOPE: CPT | Performed by: EMERGENCY MEDICINE

## 2022-06-26 PROCEDURE — 93005 ELECTROCARDIOGRAM TRACING: CPT

## 2022-06-26 PROCEDURE — 96361 HYDRATE IV INFUSION ADD-ON: CPT

## 2022-06-26 PROCEDURE — 71045 X-RAY EXAM CHEST 1 VIEW: CPT

## 2022-06-26 PROCEDURE — 80053 COMPREHEN METABOLIC PANEL: CPT | Performed by: EMERGENCY MEDICINE

## 2022-06-26 RX ORDER — ACETAMINOPHEN 325 MG/1
975 TABLET ORAL EVERY 8 HOURS PRN
COMMUNITY

## 2022-06-26 RX ORDER — ONDANSETRON 2 MG/ML
4 INJECTION INTRAMUSCULAR; INTRAVENOUS EVERY 6 HOURS PRN
Status: DISCONTINUED | OUTPATIENT
Start: 2022-06-26 | End: 2022-06-27 | Stop reason: HOSPADM

## 2022-06-26 RX ORDER — IPRATROPIUM BROMIDE AND ALBUTEROL SULFATE 2.5; .5 MG/3ML; MG/3ML
3 SOLUTION RESPIRATORY (INHALATION) ONCE
Status: COMPLETED | OUTPATIENT
Start: 2022-06-26 | End: 2022-06-26

## 2022-06-26 RX ORDER — KETOROLAC TROMETHAMINE 15 MG/ML
10 INJECTION, SOLUTION INTRAMUSCULAR; INTRAVENOUS ONCE
Status: COMPLETED | OUTPATIENT
Start: 2022-06-26 | End: 2022-06-26

## 2022-06-26 RX ORDER — SODIUM CHLORIDE 9 MG/ML
INJECTION, SOLUTION INTRAVENOUS CONTINUOUS
Status: DISCONTINUED | OUTPATIENT
Start: 2022-06-26 | End: 2022-06-27 | Stop reason: HOSPADM

## 2022-06-26 RX ORDER — PIPERACILLIN SODIUM, TAZOBACTAM SODIUM 3; .375 G/15ML; G/15ML
3.38 INJECTION, POWDER, LYOPHILIZED, FOR SOLUTION INTRAVENOUS EVERY 8 HOURS
Status: DISCONTINUED | OUTPATIENT
Start: 2022-06-26 | End: 2022-06-27 | Stop reason: HOSPADM

## 2022-06-26 RX ORDER — PIPERACILLIN SODIUM, TAZOBACTAM SODIUM 3; .375 G/15ML; G/15ML
3.38 INJECTION, POWDER, LYOPHILIZED, FOR SOLUTION INTRAVENOUS ONCE
Status: COMPLETED | OUTPATIENT
Start: 2022-06-26 | End: 2022-06-26

## 2022-06-26 RX ORDER — PHENOL 1.4 %
10 AEROSOL, SPRAY (ML) MUCOUS MEMBRANE AT BEDTIME
COMMUNITY

## 2022-06-26 RX ORDER — ONDANSETRON 4 MG/1
4 TABLET, ORALLY DISINTEGRATING ORAL EVERY 6 HOURS PRN
Status: DISCONTINUED | OUTPATIENT
Start: 2022-06-26 | End: 2022-06-27 | Stop reason: HOSPADM

## 2022-06-26 RX ORDER — ACETAMINOPHEN 325 MG/1
650 TABLET ORAL ONCE
Status: COMPLETED | OUTPATIENT
Start: 2022-06-26 | End: 2022-06-26

## 2022-06-26 RX ORDER — PIPERACILLIN SODIUM, TAZOBACTAM SODIUM 3; .375 G/15ML; G/15ML
3.38 INJECTION, POWDER, LYOPHILIZED, FOR SOLUTION INTRAVENOUS ONCE
Status: DISCONTINUED | OUTPATIENT
Start: 2022-06-26 | End: 2022-06-26

## 2022-06-26 RX ADMIN — SODIUM CHLORIDE 1000 ML: 9 INJECTION, SOLUTION INTRAVENOUS at 11:20

## 2022-06-26 RX ADMIN — KETOROLAC TROMETHAMINE 10 MG: 15 INJECTION, SOLUTION INTRAMUSCULAR; INTRAVENOUS at 11:20

## 2022-06-26 RX ADMIN — PIPERACILLIN AND TAZOBACTAM 3.38 G: 3; .375 INJECTION, POWDER, LYOPHILIZED, FOR SOLUTION INTRAVENOUS at 15:53

## 2022-06-26 RX ADMIN — SODIUM CHLORIDE: 9 INJECTION, SOLUTION INTRAVENOUS at 20:27

## 2022-06-26 RX ADMIN — IPRATROPIUM BROMIDE AND ALBUTEROL SULFATE 3 ML: 2.5; .5 SOLUTION RESPIRATORY (INHALATION) at 16:26

## 2022-06-26 RX ADMIN — ACETAMINOPHEN 650 MG: 325 TABLET ORAL at 11:21

## 2022-06-26 RX ADMIN — PIPERACILLIN AND TAZOBACTAM 3.38 G: 3; .375 INJECTION, POWDER, LYOPHILIZED, FOR SOLUTION INTRAVENOUS at 22:49

## 2022-06-26 ASSESSMENT — ENCOUNTER SYMPTOMS
APPETITE CHANGE: 1
WEAKNESS: 1
FEVER: 1
COUGH: 1
FATIGUE: 1

## 2022-06-26 ASSESSMENT — ACTIVITIES OF DAILY LIVING (ADL)
WEAR_GLASSES_OR_BLIND: YES
DRESSING/BATHING_DIFFICULTY: NO
ADLS_ACUITY_SCORE: 26
ADLS_ACUITY_SCORE: 24
CONCENTRATING,_REMEMBERING_OR_MAKING_DECISIONS_DIFFICULTY: NO
DEPENDENT_IADLS:: TRANSPORTATION
DOING_ERRANDS_INDEPENDENTLY_DIFFICULTY: NO
FALL_HISTORY_WITHIN_LAST_SIX_MONTHS: NO
WALKING_OR_CLIMBING_STAIRS_DIFFICULTY: NO
TOILETING_ISSUES: NO
CHANGE_IN_FUNCTIONAL_STATUS_SINCE_ONSET_OF_CURRENT_ILLNESS/INJURY: NO
DIFFICULTY_EATING/SWALLOWING: NO
ADLS_ACUITY_SCORE: 26
ADLS_ACUITY_SCORE: 39

## 2022-06-26 NOTE — ED TRIAGE NOTES
5 day history of fevers, cough and weakness.  Not taken a Covid test.  Wife states she was sick 1 week prior to his symptoms.     Triage Assessment     Row Name 06/26/22 1006       Triage Assessment (Adult)    Airway WDL WDL    Additional Documentation Breath Sounds (Group)       Respiratory WDL    Respiratory WDL WDL       Breath Sounds    Breath Sounds All Fields       Skin Circulation/Temperature WDL    Skin Circulation/Temperature WDL WDL       Cardiac WDL    Cardiac WDL WDL       Peripheral/Neurovascular WDL    Peripheral Neurovascular WDL WDL       Cognitive/Neuro/Behavioral WDL    Cognitive/Neuro/Behavioral WDL WDL

## 2022-06-26 NOTE — H&P
[facility] MEDICINE ADMISSION HISTORY AND PHYSICAL     Assessment & Plan       WW is a 71 year old male who was encouraged to go to the ER by his wife  Due to a 5 day cough.  Pt is vaccinated for covid.  He states she told me  I was coughing a lot at night, can I eat?  Denies chest pain, dyspnea, vomiting,  Diarrhea, abdominal pain.     ER work up confirms a positive covid test, negative xr chest and an unexpected  Elevation in his liver enzymes.  Abdominal ultrasound shows a dilated common  Bile duct at 10 mm and a TB of 7.3.  The white count is 7, afebrile and normal hemodynamics.  He will be admitted for biliary obstruction, cholangitis.  He is not  Septic via hemodynamics or end organ damage.       Problem list:    1.  COVID 19: cough for 5 days, xr chest negative; oxygen sat 98%; isolation, no rx  2.   Cholangitis: zosyn extended dosing; GI aware (discussed with Dr Maki);    ERCP tomorrow at [facility]; call GI/hospitalist if pt has fever, NPO after mid  3.  Biliary obstruction  4.  Transaminitis  5. Thrombocytopenia: platelet of 118, monitor  5.   COPD: continue symbicort, albuteraol as needed  6.  Dyslipidemia: lipitor          DVTP: lovenox prophylaxis  Code Status: ful codeDisposition: Observation   Expected LOS: 2 days   Goals for the hospitalization: resolve cholangitis, biliary obstructon      Disposition Plan   The patient's care was discussed with the patient, nurse  Chief Complaint  cough     HISTORY     71 year old male into the er as stated above after his wife convinced him he should  Be seen for a nagging 5 day cough.  Pt denies chest pain, dyspnea, vomiting, diarrhea, abdominal symptoms. I dont feel bad but I have a cough he says.   He is vaccinated for covid with a Pfizer series.    ER work up confirms a positive covid screen, negative xr chest, and an unexpected transaminitis. The TB is 7.3, DB 4.3, AST//228.  Abdominal ultrasound shows   No evidence of  cholecystitis though he has a dilated common bile duct at 10 mm with probable punctate stones.  The white count is normal at , he is afebrile. Pt is not ill appearing with a negative abdominal exam. He is hungry.    GI was consulted via phone who recommended admission for iv antibiotics and  ERCP tomorrow at [facility].  He is 5 days into his cough with a negative xr chest   And normal oxygen levels thus does not qualify for medications for covid.    Pt is aware of diagnosis and agrees for admission and the treatment plan.     Past Medical History     1.  COPD  2.  Macular degeneration  3.  Elevated PSA  4.  GERD  5.  Ex tobacco        Surgical History     Past Surgical History:   Procedure Laterality Date     ORIF PROXIMAL TIBIOFIBULAR JOINT Right      ZZC TOTAL HIP ARTHROPLASTY Right 12/2/2020    Procedure: ARTHROPLASTY, HIP, TOTAL, DIRECT ANTERIOR APPROACH RIGHT;  Surgeon: Jesus Cesar MD;  Location: [facility];  Service: Orthopedics     Family History    Reviewed, and History reviewed. No pertinent family history.     Social History     , ex tobacco, retired, pt is a new junkie  Social History     Tobacco Use     Smoking status: Former Smoker     Smokeless tobacco: Never Used        Allergies   No Known Allergies  Prior to Admission Medications      Prior to Admission Medications   Prescriptions Last Dose Informant Patient Reported? Taking?   Melatonin 10 MG TABS tablet 6/25/2022 at Unknown time  Yes Yes   Sig: Take 10 mg by mouth At Bedtime   Multiple Vitamins-Minerals (ICAPS AREDS FORMULA PO) 6/26/2022 at x1  Yes Yes   Sig: Take 1 capsule by mouth 2 times daily   acetaminophen (TYLENOL) 325 MG tablet Past Week at Unknown time  Yes Yes   Sig: Take 975 mg by mouth every 8 hours as needed for mild pain   albuterol (PROAIR HFA;PROVENTIL HFA;VENTOLIN HFA) 90 mcg/actuation inhaler 6/26/2022 at has with  Yes Yes   Sig: [ALBUTEROL (PROAIR HFA;PROVENTIL HFA;VENTOLIN HFA) 90 MCG/ACTUATION INHALER]  Inhale 2 puffs every 6 (six) hours as needed for wheezing.   atorvastatin (LIPITOR) 10 MG tablet 6/25/2022 at HS  No Yes   Sig: [ATORVASTATIN (LIPITOR) 10 MG TABLET] Take 1 tablet (10 mg total) by mouth daily.   budesonide-formoteroL (SYMBICORT) 80-4.5 mcg/actuation inhaler Unknown at not with  No Yes   Sig: [BUDESONIDE-FORMOTEROL (SYMBICORT) 80-4.5 MCG/ACTUATION INHALER] Inhale 2 puffs 2 (two) times a day.   clonazePAM (KLONOPIN) 2 MG tablet 6/25/2022 at Unknown time  Yes Yes   Sig: [CLONAZEPAM (KLONOPIN) 2 MG TABLET] Take 2 mg by mouth at bedtime.    famotidine (PEPCID) 40 MG tablet 6/25/2022 at HS  No Yes   Sig: TAKE 1 TABLET BY MOUTH DAILY.   ipratropium - albuterol 0.5 mg/2.5 mg/3 mL (DUONEB) 0.5-2.5 (3) MG/3ML neb solution 6/26/2022 at Unknown time  No Yes   Sig: INHALE THE CONTENTS OF ONE VIAL VIA NEBULIZER EVERY 6 HOURS AS NEEDED   sildenafil (REVATIO) 20 mg tablet Unknown at Unknown time  No Yes   Sig: [SILDENAFIL (REVATIO) 20 MG TABLET] Take 2-3 tabs PO 1 hr before sex      Facility-Administered Medications: None      Review of Systems     A 12 point comprehensive review of systems was negative except as noted above in HPI.    PHYSICAL EXAMINATION       Vitals      Temp:  [98.9  F (37.2  C)-99.5  F (37.5  C)] 98.9  F (37.2  C)  Pulse:  [66-87] 66  Resp:  [16] 16  BP: (106-133)/(56-63) 106/58  SpO2:  [94 %-96 %] 95 %    Examination     GENERAL:  Alert, appears comfortable, in no acute distress, appears stated age   HEAD:  Normocephalic, without obvious abnormality, atraumatic   EYES:  PERRL, conjunctiva/corneas clear, mild scleral icterus   NOSE: Nares normal, septum midline, mucosa normal, no drainage   THROAT: Lips, mucosa, and tongue normal; teeth and gums normal, mouth moist   NECK: Supple, symmetrical, trachea midline   BACK:   Symmetric, no curvature, ROM normal   LUNGS:   Clear to auscultation bilaterally, no rales, rhonchi, or wheezing, symmetric chest rise on inhalation, respirations unlabored    CHEST WALL:  No tenderness or deformity   HEART:  Regular rate and rhythm, S1 and S2 normal, no murmur, rub, or gallop    ABDOMEN:   Soft, non-tender, bowel sounds active all four quadrants, no masses, no organomegaly, no rebound or guarding   EXTREMITIES: Extremities normal, atraumatic, no cyanosis or edema    SKIN: Dry to touch, no exanthems in the visualized areas   NEURO: Alert, oriented x 4, moves all four extremities freely, non-focal   PSYCH: Cooperative, behavior is appropriate      Pertinent Lab     Most Recent 3 BMP's:Recent Labs   Lab Test 06/26/22  1048 12/02/20  1803 12/02/20  0549 12/01/20  1917     --  141 141   POTASSIUM 3.3* 4.0 3.7 3.9   CHLORIDE 104  --  110* 105   CO2 21*  --  23 23   BUN 16  --  19 19   CR 0.79 1.02 0.96 1.17   ANIONGAP 11  --  8 13   SIMON 8.5  --  8.6 8.9   *  --  130* 116         Pertinent Radiology     Radiology Results:   Recent Results (from the past 24 hour(s))   XR Chest Port 1 View    Narrative    EXAM: XR CHEST PORT 1 VIEW  LOCATION: [facility]  DATE/TIME: 6/26/2022 10:43 AM    INDICATION: Cough.  COMPARISON: 02/17/2022      Impression    IMPRESSION: Heart size and vascularity are normal. Calcified bilateral granulomata redemonstrated. Shallow inspiration accentuates bibasilar subsegmental atelectasis/scar. No focal consolidation, pneumothorax nor pleural effusion.   Abdomen US, limited (RUQ only)    Narrative    EXAM: US ABDOMEN LIMITED  LOCATION: [facility]  DATE/TIME: 6/26/2022 2:32 PM    INDICATION: Elevated liver enzymes and obstructive pattern  COMPARISON: None.  TECHNIQUE: Limited abdominal ultrasound.    FINDINGS:    GALLBLADDER: There is gallbladder wall edema with internal sludge and questionable punctate stones. No pathologic luminal distention. Questionable trace pericholecystic fluid.    BILE DUCTS: There is intra and extrahepatic biliary ductal dilation. The common duct measures 10 mm. No  choledocholithiasis in the visualized portions of the common duct.    LIVER: Likely hepatic steatosis with smooth contour. No focal mass. Simple appearing right hepatic cyst, no specific follow-up recommended.    RIGHT KIDNEY: No hydronephrosis.    PANCREAS: The visualized portions are normal.    No ascites.      Impression    IMPRESSION:  1.  Findings suspicious for biliary obstruction. No choledocholithiasis or other etiology for obstruction in the visualized portions of the common duct.  2.  Gallbladder wall edema with likely internal sludge and questionable trace pericholecystic fluid. Favor findings are secondary to #1 given lack of pathologic luminal distention.  3.  Likely hepatic steatosis.         EKG Results: personally reviewed.     Advance Care Planning        Pippa Martin MD  [facility]   Phone: #[phone]

## 2022-06-26 NOTE — PHARMACY-ADMISSION MEDICATION HISTORY
Pharmacy Note - Admission Medication History    Pertinent Provider Information:   - The patient states he is on an inhaler for COPD but did not recall the name (states it is not Symbicort, Dulera, Advair, or Wixela). He has had trouble getting his inhalers covered by insurance, so he typically uses his duo-neb and prn albuterol inhaler. I left the Symbicort inhaler on his medication list, as this was the most recent prescription that was sent in for the patient ([facility] provider on 2/28/22). Per the patient's wife he is not regularly taking a maintenance COPD inhaler (uses neb instead).     ______________________________________________________________________    Prior To Admission (PTA) med list completed and updated in EMR.       PTA Med List   Medication Sig Last Dose     acetaminophen (TYLENOL) 325 MG tablet Take 975 mg by mouth every 8 hours as needed for mild pain Past Week at Unknown time     albuterol (PROAIR HFA;PROVENTIL HFA;VENTOLIN HFA) 90 mcg/actuation inhaler [ALBUTEROL (PROAIR HFA;PROVENTIL HFA;VENTOLIN HFA) 90 MCG/ACTUATION INHALER] Inhale 2 puffs every 6 (six) hours as needed for wheezing. 6/26/2022 at has with     atorvastatin (LIPITOR) 10 MG tablet [ATORVASTATIN (LIPITOR) 10 MG TABLET] Take 1 tablet (10 mg total) by mouth daily. 6/25/2022 at HS     budesonide-formoteroL (SYMBICORT) 80-4.5 mcg/actuation inhaler [BUDESONIDE-FORMOTEROL (SYMBICORT) 80-4.5 MCG/ACTUATION INHALER] Inhale 2 puffs 2 (two) times a day. Unknown at not with     clonazePAM (KLONOPIN) 2 MG tablet [CLONAZEPAM (KLONOPIN) 2 MG TABLET] Take 2 mg by mouth at bedtime.  6/25/2022 at Unknown time     famotidine (PEPCID) 40 MG tablet TAKE 1 TABLET BY MOUTH DAILY. 6/25/2022 at HS     ipratropium - albuterol 0.5 mg/2.5 mg/3 mL (DUONEB) 0.5-2.5 (3) MG/3ML neb solution INHALE THE CONTENTS OF ONE VIAL VIA NEBULIZER EVERY 6 HOURS AS NEEDED 6/26/2022 at Unknown time     Melatonin 10 MG TABS tablet Take 10 mg by mouth At Bedtime 6/25/2022 at  Unknown time     Multiple Vitamins-Minerals (ICAPS AREDS FORMULA PO) Take 1 capsule by mouth 2 times daily 6/26/2022 at x1     sildenafil (REVATIO) 20 mg tablet [SILDENAFIL (REVATIO) 20 MG TABLET] Take 2-3 tabs PO 1 hr before sex Unknown at Unknown time       Information source(s): Patient, Family member and CareEverywhere/SureScripts  Method of interview communication: in-person    Summary of Changes to PTA Med List  New: melatonin, AREDS  Discontinued: aspirin, Dulera, oxycodone  Changed: acetaminophen    Patient was asked about OTC/herbal products specifically.  PTA med list reflects this.    In the past week, patient estimated taking medication this percent of the time:  greater than 90%.    Allergies were reviewed, assessed, and updated with the patient.      Medications available for use during hospital stay: albuterol inhaler.     The information provided in this note is only as accurate as the sources available at the time of the update(s).    Thank you for the opportunity to participate in the care of this patient.    Jeannine Pizano RPH  6/26/2022 4:31 PM

## 2022-06-26 NOTE — PLAN OF CARE
Problem: Plan of Care - These are the overarching goals to be used throughout the patient stay.    Goal: Optimal Comfort and Wellbeing  Outcome: Ongoing, Progressing  Intervention: Monitor Pain and Promote Comfort  Recent Flowsheet Documentation  Taken 6/26/2022 1800 by Nomi Cee, RN  Pain Management Interventions: declines     Patient Aox4, pleasant, cooperative. Denies pain. Ind in room. Steady on feet. Reg diet now, NPO at midnight for ERCP tomorrow. Vitally stable on room air.

## 2022-06-26 NOTE — ED NOTES
Pt received duoneb and tolerated it well. Pt BS diminished with faint wheeze on the right. Post neb there is better aeration. Pt remains on room air. RT will continue to monitor.    Joy Wagner, RT

## 2022-06-26 NOTE — ED NOTES
"When RN doing assessment, pt reports, \"I am just here because she told me to come\". Pt pointing at wife.     "

## 2022-06-26 NOTE — PROGRESS NOTES
EKG completed per doctor order. Normal sinus. RN received printout as well as one placed in chart.    Trae Plunkett RT

## 2022-06-26 NOTE — ED PROVIDER NOTES
EMERGENCY DEPARTMENT ENCOUNTER       ED Course & Medical Decision Making     I saw and examined the patient.  He is here for chest x-ray that returns unremarkable.  COVID is positive as anticipated given his recent exposure and symptoms.    His laboratory studies were initially coming back unremarkable and then his urine came back with a significant amount of bilirubin.  I then added on LFTs that returned in an obstructive pattern and so I reflexed this to a right upper quadrant ultrasound.  This is concerning for biliary obstruction.    I discussed the case with gastroenterology and we will start the patient on Zosyn and plan for ERCP.  They would prefer [facility], however there are no beds that we can find where an ERCP can be done tonight and they will follow the patient at this hospital and plan for ERCP tomorrow unless he becomes unstable.    I did discuss the case with the hospitalist who accepted the patient.  Patient is stable, updated and in agreement      10:20 AM I met with the patient and his wife to gather history and to perform my initial exam. We discussed plans for the ED course, including diagnostic testing and treatment. PPE: N95 mask, surgical cap, eye protection, gloves  12:50 PM I rechecked and updated the patient on his results.   3:29 PM I spoke to Dr. [facility].   Prior to making a final disposition on this patient the results of patient's tests and other diagnostic studies were discussed with the patient. All questions were answered. Patient expressed understanding of the plan and was amenable to it.    Medications   0.9% sodium chloride BOLUS (0 mLs Intravenous Stopped 6/26/22 1347)     Followed by   sodium chloride 0.9% infusion ( Intravenous Not Given 6/26/22 1347)   ketorolac (TORADOL) injection 10 mg (10 mg Intravenous Given 6/26/22 1120)   acetaminophen (TYLENOL) tablet 650 mg (650 mg Oral Given 6/26/22 1121)   piperacillin-tazobactam (ZOSYN) 3.375 g vial to attach to NS  100 mL bag (3.375 g Intravenous Given 6/26/22 8292)   ipratropium - albuterol 0.5 mg/2.5 mg/3 mL (DUONEB) neb solution 3 mL (3 mLs Nebulization Given 6/26/22 1626)       Final Impression     1. Cholangitis    2. Biliary obstruction    3. COVID-19            Chief Complaint     Chief Complaint   Patient presents with     Cough     Fever       5 day history of fevers, cough and weakness.  Not taken a Covid test.  Wife states she was sick 1 week prior to his symptoms.     Triage Assessment     Row Name 06/26/22 1006       Triage Assessment (Adult)    Airway WDL WDL    Additional Documentation Breath Sounds (Group)       Respiratory WDL    Respiratory WDL WDL       Breath Sounds    Breath Sounds All Fields       Skin Circulation/Temperature WDL    Skin Circulation/Temperature WDL WDL       Cardiac WDL    Cardiac WDL WDL       Peripheral/Neurovascular WDL    Peripheral Neurovascular WDL WDL       Cognitive/Neuro/Behavioral WDL    Cognitive/Neuro/Behavioral WDL WDL                  HPI       You Montano is a 71 year old male who presents for evaluation of fever and cough.    Patient reports a cough and fever that began 5 days ago. Endorses concentrated urine and one episode of urinary incontinence. Patient has also been feeling generally weak and fatigued and has had a decreased appetite and fluid intake.     Patient presents with his wife who was sick with respiratory symptoms last week. She has since cleared her symptoms.     I, Lisette Bower am serving as a scribe to document services personally performed by Chan Garcia M.D. based on my observation and the provider's statements to me. IChan M.D attest that Lisette Bower is acting in a scribe capacity, has observed my performance of the services and has documented them in accordance with my direction.    Past Medical History     History reviewed. No pertinent past medical history.  Past Surgical History:   Procedure Laterality Date     ORIF  "PROXIMAL TIBIOFIBULAR JOINT Right      ZZC TOTAL HIP ARTHROPLASTY Right 12/2/2020    Procedure: ARTHROPLASTY, HIP, TOTAL, DIRECT ANTERIOR APPROACH RIGHT;  Surgeon: Jesus Cesar MD;  Location: [facility];  Service: Orthopedics     History reviewed. No pertinent family history.   Social History     Tobacco Use     Smoking status: Former Smoker     Smokeless tobacco: Never Used       Relevant past medical, surgical, family and social history as documented above, has been reviewed and discussed with patient. No changes or additions, unless otherwise noted in the HPI.    Current Medications     acetaminophen (TYLENOL) 325 MG tablet  albuterol (PROAIR HFA;PROVENTIL HFA;VENTOLIN HFA) 90 mcg/actuation inhaler  atorvastatin (LIPITOR) 10 MG tablet  budesonide-formoteroL (SYMBICORT) 80-4.5 mcg/actuation inhaler  clonazePAM (KLONOPIN) 2 MG tablet  famotidine (PEPCID) 40 MG tablet  ipratropium - albuterol 0.5 mg/2.5 mg/3 mL (DUONEB) 0.5-2.5 (3) MG/3ML neb solution  Melatonin 10 MG TABS tablet  Multiple Vitamins-Minerals (ICAPS AREDS FORMULA PO)  sildenafil (REVATIO) 20 mg tablet        Allergies     No Known Allergies    Review of Systems     Review of Systems   Constitutional: Positive for appetite change (Decreased), fatigue and fever.   Respiratory: Positive for cough.    Genitourinary:        Positive for 1x episode urinary incontinence  Positive for concentrated urine   Neurological: Positive for weakness (Generalized).   All other systems reviewed and are negative.       Remainder of systems reviewed, unless noted in HPI all others negative.    Physical Exam     /58   Pulse 66   Temp 98.9  F (37.2  C) (Oral)   Resp 16   Ht 1.676 m (5' 6\")   Wt 70.3 kg (155 lb)   SpO2 95%   BMI 25.02 kg/m      Physical Exam  Vitals and nursing note reviewed.   Constitutional:       General: He is not in acute distress.  HENT:      Head: Normocephalic.      Nose: Nose normal.   Eyes:      General: No scleral " icterus.  Cardiovascular:      Rate and Rhythm: Normal rate.   Pulmonary:      Effort: Pulmonary effort is normal.   Abdominal:      Tenderness: There is no abdominal tenderness. There is no guarding or rebound.   Musculoskeletal:      Cervical back: Neck supple.   Skin:     Findings: No rash.   Neurological:      Mental Status: He is alert. Mental status is at baseline.   Psychiatric:         Mood and Affect: Mood normal.             Labs & Imaging         Labs Ordered and Resulted from Time of ED Arrival to Time of ED Departure   BASIC METABOLIC PANEL - Abnormal       Result Value    Sodium 136      Potassium 3.3 (*)     Chloride 104      Carbon Dioxide (CO2) 21 (*)     Anion Gap 11      Urea Nitrogen 16      Creatinine 0.79      Calcium 8.5      Glucose 151 (*)     GFR Estimate >90     ROUTINE UA WITH MICROSCOPIC REFLEX TO CULTURE - Abnormal    Color Urine Dark Yellow (*)     Appearance Urine Clear      Glucose Urine Negative      Bilirubin Urine 6.0 mg/dL (*)     Ketones Urine Negative      Specific Gravity Urine 1.024      Blood Urine Negative      pH Urine 6.0      Protein Albumin Urine 30  (*)     Urobilinogen Urine 4.0 (*)     Nitrite Urine Negative      Leukocyte Esterase Urine Negative      Mucus Urine Present (*)     RBC Urine 2      WBC Urine 4      Squamous Epithelials Urine <1     INFLUENZA A/B & SARS-COV2 PCR MULTIPLEX - Abnormal    Influenza A PCR Negative      Influenza B PCR Negative      RSV PCR Negative      SARS CoV2 PCR Positive (*)    CBC WITH PLATELETS AND DIFFERENTIAL - Abnormal    WBC Count 6.8      RBC Count 4.24 (*)     Hemoglobin 12.9 (*)     Hematocrit 38.2 (*)     MCV 90      MCH 30.4      MCHC 33.8      RDW 13.9      Platelet Count 118 (*)     % Neutrophils 86      % Lymphocytes 5      % Monocytes 8      % Eosinophils 0      % Basophils 0      % Immature Granulocytes 1      NRBCs per 100 WBC 0      Absolute Neutrophils 5.8      Absolute Lymphocytes 0.4 (*)     Absolute Monocytes 0.5       Absolute Eosinophils 0.0      Absolute Basophils 0.0      Absolute Immature Granulocytes 0.0      Absolute NRBCs 0.0     HEPATIC FUNCTION PANEL - Abnormal    Bilirubin Total 7.3 (*)     Bilirubin Direct 4.3 (*)     Protein Total 6.4      Albumin 2.7 (*)     Alkaline Phosphatase 334 (*)      (*)      (*)          Results for orders placed or performed during the hospital encounter of 06/26/22   XR Chest Port 1 View    Impression    IMPRESSION: Heart size and vascularity are normal. Calcified bilateral granulomata redemonstrated. Shallow inspiration accentuates bibasilar subsegmental atelectasis/scar. No focal consolidation, pneumothorax nor pleural effusion.   Abdomen US, limited (RUQ only)    Impression    IMPRESSION:  1.  Findings suspicious for biliary obstruction. No choledocholithiasis or other etiology for obstruction in the visualized portions of the common duct.  2.  Gallbladder wall edema with likely internal sludge and questionable trace pericholecystic fluid. Favor findings are secondary to #1 given lack of pathologic luminal distention.  3.  Likely hepatic steatosis.       Basic metabolic panel   Result Value Ref Range    Sodium 136 136 - 145 mmol/L    Potassium 3.3 (L) 3.5 - 5.0 mmol/L    Chloride 104 98 - 107 mmol/L    Carbon Dioxide (CO2) 21 (L) 22 - 31 mmol/L    Anion Gap 11 5 - 18 mmol/L    Urea Nitrogen 16 8 - 28 mg/dL    Creatinine 0.79 0.70 - 1.30 mg/dL    Calcium 8.5 8.5 - 10.5 mg/dL    Glucose 151 (H) 70 - 125 mg/dL    GFR Estimate >90 >60 mL/min/1.73m2   UA with Microscopic reflex to Culture    Specimen: Urine, Clean Catch   Result Value Ref Range    Color Urine Dark Yellow (A) Colorless, Straw, Light Yellow, Yellow    Appearance Urine Clear Clear    Glucose Urine Negative Negative mg/dL    Bilirubin Urine 6.0 mg/dL (A) Negative    Ketones Urine Negative Negative mg/dL    Specific Gravity Urine 1.024 1.001 - 1.030    Blood Urine Negative Negative    pH Urine 6.0 5.0 - 7.0     Protein Albumin Urine 30  (A) Negative mg/dL    Urobilinogen Urine 4.0 (A) <2.0 mg/dL    Nitrite Urine Negative Negative    Leukocyte Esterase Urine Negative Negative    Mucus Urine Present (A) None Seen /LPF    RBC Urine 2 <=2 /HPF    WBC Urine 4 <=5 /HPF    Squamous Epithelials Urine <1 <=1 /HPF   Symptomatic; Unknown Influenza A/B & SARS-CoV2 (COVID-19) Virus PCR Multiplex Nasopharyngeal    Specimen: Nasopharyngeal; Swab   Result Value Ref Range    Influenza A PCR Negative Negative    Influenza B PCR Negative Negative    RSV PCR Negative Negative    SARS CoV2 PCR Positive (A) Negative   CBC with platelets and differential   Result Value Ref Range    WBC Count 6.8 4.0 - 11.0 10e3/uL    RBC Count 4.24 (L) 4.40 - 5.90 10e6/uL    Hemoglobin 12.9 (L) 13.3 - 17.7 g/dL    Hematocrit 38.2 (L) 40.0 - 53.0 %    MCV 90 78 - 100 fL    MCH 30.4 26.5 - 33.0 pg    MCHC 33.8 31.5 - 36.5 g/dL    RDW 13.9 10.0 - 15.0 %    Platelet Count 118 (L) 150 - 450 10e3/uL    % Neutrophils 86 %    % Lymphocytes 5 %    % Monocytes 8 %    % Eosinophils 0 %    % Basophils 0 %    % Immature Granulocytes 1 %    NRBCs per 100 WBC 0 <1 /100    Absolute Neutrophils 5.8 1.6 - 8.3 10e3/uL    Absolute Lymphocytes 0.4 (L) 0.8 - 5.3 10e3/uL    Absolute Monocytes 0.5 0.0 - 1.3 10e3/uL    Absolute Eosinophils 0.0 0.0 - 0.7 10e3/uL    Absolute Basophils 0.0 0.0 - 0.2 10e3/uL    Absolute Immature Granulocytes 0.0 <=0.4 10e3/uL    Absolute NRBCs 0.0 10e3/uL   Hepatic function panel   Result Value Ref Range    Bilirubin Total 7.3 (H) 0.0 - 1.0 mg/dL    Bilirubin Direct 4.3 (H) <=0.5 mg/dL    Protein Total 6.4 6.0 - 8.0 g/dL    Albumin 2.7 (L) 3.5 - 5.0 g/dL    Alkaline Phosphatase 334 (H) 45 - 120 U/L     (H) 0 - 40 U/L     (H) 0 - 45 U/L       Chan Garcia MD  Emergency Medicine  [facility] EMERGENCY ROOM  [address] [address] [phone]  6/26/2022       Chan Garcia MD  06/26/22  0676

## 2022-06-27 ENCOUNTER — ANESTHESIA (OUTPATIENT)
Dept: SURGERY | Facility: HOSPITAL | Age: 72
End: 2022-06-27
Payer: COMMERCIAL

## 2022-06-27 ENCOUNTER — HOSPITAL ENCOUNTER (INPATIENT)
Facility: CLINIC | Age: 72
LOS: 1 days | Discharge: HOME | DRG: 444 | End: 2022-06-28
Attending: INTERNAL MEDICINE | Admitting: INTERNAL MEDICINE
Payer: COMMERCIAL

## 2022-06-27 ENCOUNTER — ANESTHESIA EVENT (OUTPATIENT)
Dept: SURGERY | Facility: HOSPITAL | Age: 72
End: 2022-06-27
Payer: COMMERCIAL

## 2022-06-27 ENCOUNTER — HOSPITAL ENCOUNTER (OUTPATIENT)
Facility: HOSPITAL | Age: 72
Discharge: OTHER INSTITUTION | End: 2022-06-27
Attending: INTERNAL MEDICINE | Admitting: INTERNAL MEDICINE
Payer: COMMERCIAL

## 2022-06-27 ENCOUNTER — APPOINTMENT (OUTPATIENT)
Dept: RADIOLOGY | Facility: HOSPITAL | Age: 72
End: 2022-06-27
Attending: INTERNAL MEDICINE
Payer: COMMERCIAL

## 2022-06-27 VITALS
DIASTOLIC BLOOD PRESSURE: 72 MMHG | TEMPERATURE: 98.6 F | RESPIRATION RATE: 16 BRPM | SYSTOLIC BLOOD PRESSURE: 161 MMHG | HEART RATE: 66 BPM | OXYGEN SATURATION: 97 %

## 2022-06-27 VITALS
DIASTOLIC BLOOD PRESSURE: 63 MMHG | RESPIRATION RATE: 16 BRPM | SYSTOLIC BLOOD PRESSURE: 135 MMHG | HEIGHT: 66 IN | HEART RATE: 71 BPM | WEIGHT: 155 LBS | OXYGEN SATURATION: 95 % | TEMPERATURE: 97.6 F | BODY MASS INDEX: 24.91 KG/M2

## 2022-06-27 DIAGNOSIS — U07.1 COVID-19: ICD-10-CM

## 2022-06-27 DIAGNOSIS — K80.50 CHOLEDOCHOLITHIASIS: Primary | ICD-10-CM

## 2022-06-27 LAB
ALBUMIN SERPL-MCNC: 2.3 G/DL (ref 3.5–5)
ALP SERPL-CCNC: 276 U/L (ref 45–120)
ALT SERPL W P-5'-P-CCNC: 175 U/L (ref 0–45)
ANION GAP SERPL CALCULATED.3IONS-SCNC: 7 MMOL/L (ref 5–18)
AST SERPL W P-5'-P-CCNC: 109 U/L (ref 0–40)
ATRIAL RATE - MUSE: 60 BPM
BILIRUB SERPL-MCNC: 4.4 MG/DL (ref 0–1)
BUN SERPL-MCNC: 13 MG/DL (ref 8–28)
CALCIUM SERPL-MCNC: 8 MG/DL (ref 8.5–10.5)
CHLORIDE BLD-SCNC: 111 MMOL/L (ref 98–107)
CO2 SERPL-SCNC: 23 MMOL/L (ref 22–31)
CREAT SERPL-MCNC: 0.71 MG/DL (ref 0.7–1.3)
DIASTOLIC BLOOD PRESSURE - MUSE: NORMAL MMHG
ERCP: NORMAL
ERYTHROCYTE [DISTWIDTH] IN BLOOD BY AUTOMATED COUNT: 14.3 % (ref 10–15)
GFR SERPL CREATININE-BSD FRML MDRD: >90 ML/MIN/1.73M2
GLUCOSE BLD-MCNC: 105 MG/DL (ref 70–125)
HCT VFR BLD AUTO: 33.4 % (ref 40–53)
HGB BLD-MCNC: 11.4 G/DL (ref 13.3–17.7)
INTERPRETATION ECG - MUSE: NORMAL
MCH RBC QN AUTO: 30.9 PG (ref 26.5–33)
MCHC RBC AUTO-ENTMCNC: 34.1 G/DL (ref 31.5–36.5)
MCV RBC AUTO: 91 FL (ref 78–100)
P AXIS - MUSE: 70 DEGREES
PLATELET # BLD AUTO: 106 10E3/UL (ref 150–450)
POTASSIUM BLD-SCNC: 3.3 MMOL/L (ref 3.5–5)
PR INTERVAL - MUSE: 148 MS
PROT SERPL-MCNC: 5.7 G/DL (ref 6–8)
QRS DURATION - MUSE: 82 MS
QT - MUSE: 432 MS
QTC - MUSE: 432 MS
R AXIS - MUSE: 32 DEGREES
RBC # BLD AUTO: 3.69 10E6/UL (ref 4.4–5.9)
SODIUM SERPL-SCNC: 141 MMOL/L (ref 136–145)
SYSTOLIC BLOOD PRESSURE - MUSE: NORMAL MMHG
T AXIS - MUSE: 47 DEGREES
VENTRICULAR RATE- MUSE: 60 BPM
WBC # BLD AUTO: 4.3 10E3/UL (ref 4–11)

## 2022-06-27 PROCEDURE — 99233 SBSQ HOSP IP/OBS HIGH 50: CPT | Performed by: INTERNAL MEDICINE

## 2022-06-27 PROCEDURE — 250N000025 HC SEVOFLURANE, PER MIN: Performed by: INTERNAL MEDICINE

## 2022-06-27 PROCEDURE — 250N000011 HC RX IP 250 OP 636: Performed by: ANESTHESIOLOGY

## 2022-06-27 PROCEDURE — C1726 CATH, BAL DIL, NON-VASCULAR: HCPCS | Performed by: INTERNAL MEDICINE

## 2022-06-27 PROCEDURE — 250N000011 HC RX IP 250 OP 636: Performed by: INTERNAL MEDICINE

## 2022-06-27 PROCEDURE — 255N000002 HC RX 255 OP 636: Performed by: INTERNAL MEDICINE

## 2022-06-27 PROCEDURE — 250N000011 HC RX IP 250 OP 636: Performed by: EMERGENCY MEDICINE

## 2022-06-27 PROCEDURE — 370N000017 HC ANESTHESIA TECHNICAL FEE, PER MIN: Performed by: INTERNAL MEDICINE

## 2022-06-27 PROCEDURE — 250N000011 HC RX IP 250 OP 636: Performed by: NURSE ANESTHETIST, CERTIFIED REGISTERED

## 2022-06-27 PROCEDURE — 999N000157 HC STATISTIC RCP TIME EA 10 MIN

## 2022-06-27 PROCEDURE — 258N000003 HC RX IP 258 OP 636: Performed by: INTERNAL MEDICINE

## 2022-06-27 PROCEDURE — 710N000010 HC RECOVERY PHASE 1, LEVEL 2, PER MIN: Performed by: INTERNAL MEDICINE

## 2022-06-27 PROCEDURE — C2617 STENT, NON-COR, TEM W/O DEL: HCPCS | Performed by: INTERNAL MEDICINE

## 2022-06-27 PROCEDURE — 120N000001 HC R&B MED SURG/OB

## 2022-06-27 PROCEDURE — C1769 GUIDE WIRE: HCPCS | Performed by: INTERNAL MEDICINE

## 2022-06-27 PROCEDURE — 36415 COLL VENOUS BLD VENIPUNCTURE: CPT | Performed by: EMERGENCY MEDICINE

## 2022-06-27 PROCEDURE — 360N000082 HC SURGERY LEVEL 2 W/ FLUORO, PER MIN: Performed by: INTERNAL MEDICINE

## 2022-06-27 PROCEDURE — 94640 AIRWAY INHALATION TREATMENT: CPT

## 2022-06-27 PROCEDURE — 250N000009 HC RX 250: Performed by: INTERNAL MEDICINE

## 2022-06-27 PROCEDURE — 250N000013 HC RX MED GY IP 250 OP 250 PS 637: Performed by: EMERGENCY MEDICINE

## 2022-06-27 PROCEDURE — 272N000001 HC OR GENERAL SUPPLY STERILE: Performed by: INTERNAL MEDICINE

## 2022-06-27 PROCEDURE — 85014 HEMATOCRIT: CPT | Performed by: EMERGENCY MEDICINE

## 2022-06-27 PROCEDURE — 80053 COMPREHEN METABOLIC PANEL: CPT | Performed by: EMERGENCY MEDICINE

## 2022-06-27 PROCEDURE — 250N000009 HC RX 250: Performed by: NURSE ANESTHETIST, CERTIFIED REGISTERED

## 2022-06-27 PROCEDURE — 999N000141 HC STATISTIC PRE-PROCEDURE NURSING ASSESSMENT: Performed by: INTERNAL MEDICINE

## 2022-06-27 PROCEDURE — 710N000012 HC RECOVERY PHASE 2, PER MINUTE: Performed by: INTERNAL MEDICINE

## 2022-06-27 PROCEDURE — 258N000003 HC RX IP 258 OP 636: Performed by: ANESTHESIOLOGY

## 2022-06-27 PROCEDURE — 99222 1ST HOSP IP/OBS MODERATE 55: CPT | Performed by: SURGERY

## 2022-06-27 PROCEDURE — 258N000003 HC RX IP 258 OP 636: Performed by: EMERGENCY MEDICINE

## 2022-06-27 PROCEDURE — 258N000003 HC RX IP 258 OP 636: Performed by: NURSE ANESTHETIST, CERTIFIED REGISTERED

## 2022-06-27 PROCEDURE — 999N000180 XR SURGERY CARM FLUORO LESS THAN 5 MIN

## 2022-06-27 PROCEDURE — 250N000013 HC RX MED GY IP 250 OP 250 PS 637: Performed by: HOSPITALIST

## 2022-06-27 PROCEDURE — 250N000009 HC RX 250: Performed by: ANESTHESIOLOGY

## 2022-06-27 DEVICE — BILIARY STENT WITH NAVIFLEXTM RX DELIVERY SYSTEM
Type: IMPLANTABLE DEVICE | Site: BILE DUCT | Status: FUNCTIONAL
Brand: ADVANIX™ BILIARY

## 2022-06-27 RX ORDER — HYDROMORPHONE HYDROCHLORIDE 1 MG/ML
0.2 INJECTION, SOLUTION INTRAMUSCULAR; INTRAVENOUS; SUBCUTANEOUS EVERY 5 MIN PRN
Status: DISCONTINUED | OUTPATIENT
Start: 2022-06-27 | End: 2022-06-27 | Stop reason: HOSPADM

## 2022-06-27 RX ORDER — FAMOTIDINE 20 MG/1
40 TABLET, FILM COATED ORAL AT BEDTIME
Status: DISCONTINUED | OUTPATIENT
Start: 2022-06-27 | End: 2022-06-27 | Stop reason: HOSPADM

## 2022-06-27 RX ORDER — DEXAMETHASONE SODIUM PHOSPHATE 10 MG/ML
INJECTION, SOLUTION INTRAMUSCULAR; INTRAVENOUS PRN
Status: DISCONTINUED | OUTPATIENT
Start: 2022-06-27 | End: 2022-06-27

## 2022-06-27 RX ORDER — ONDANSETRON 4 MG/1
4 TABLET, ORALLY DISINTEGRATING ORAL EVERY 6 HOURS PRN
Status: DISCONTINUED | OUTPATIENT
Start: 2022-06-27 | End: 2022-06-28 | Stop reason: HOSPADM

## 2022-06-27 RX ORDER — LIDOCAINE 40 MG/G
CREAM TOPICAL
Status: DISCONTINUED | OUTPATIENT
Start: 2022-06-27 | End: 2022-06-27 | Stop reason: HOSPADM

## 2022-06-27 RX ORDER — PIPERACILLIN SODIUM, TAZOBACTAM SODIUM 3; .375 G/15ML; G/15ML
3.38 INJECTION, POWDER, LYOPHILIZED, FOR SOLUTION INTRAVENOUS EVERY 8 HOURS
Status: DISCONTINUED | OUTPATIENT
Start: 2022-06-27 | End: 2022-06-28 | Stop reason: HOSPADM

## 2022-06-27 RX ORDER — ONDANSETRON 2 MG/ML
INJECTION INTRAMUSCULAR; INTRAVENOUS PRN
Status: DISCONTINUED | OUTPATIENT
Start: 2022-06-27 | End: 2022-06-27

## 2022-06-27 RX ORDER — NALOXONE HYDROCHLORIDE 0.4 MG/ML
0.2 INJECTION, SOLUTION INTRAMUSCULAR; INTRAVENOUS; SUBCUTANEOUS
Status: DISCONTINUED | OUTPATIENT
Start: 2022-06-27 | End: 2022-06-27 | Stop reason: HOSPADM

## 2022-06-27 RX ORDER — ACETAMINOPHEN 325 MG/1
650 TABLET ORAL EVERY 6 HOURS PRN
Status: DISCONTINUED | OUTPATIENT
Start: 2022-06-27 | End: 2022-06-28 | Stop reason: HOSPADM

## 2022-06-27 RX ORDER — OXYCODONE HYDROCHLORIDE 5 MG/1
5 TABLET ORAL EVERY 4 HOURS PRN
Status: DISCONTINUED | OUTPATIENT
Start: 2022-06-27 | End: 2022-06-27 | Stop reason: HOSPADM

## 2022-06-27 RX ORDER — ONDANSETRON 2 MG/ML
4 INJECTION INTRAMUSCULAR; INTRAVENOUS EVERY 6 HOURS PRN
Status: DISCONTINUED | OUTPATIENT
Start: 2022-06-27 | End: 2022-06-28 | Stop reason: HOSPADM

## 2022-06-27 RX ORDER — FENTANYL CITRATE 50 UG/ML
INJECTION, SOLUTION INTRAMUSCULAR; INTRAVENOUS PRN
Status: DISCONTINUED | OUTPATIENT
Start: 2022-06-27 | End: 2022-06-27

## 2022-06-27 RX ORDER — CLONAZEPAM 1 MG/1
2 TABLET ORAL AT BEDTIME
Status: DISCONTINUED | OUTPATIENT
Start: 2022-06-27 | End: 2022-06-27 | Stop reason: HOSPADM

## 2022-06-27 RX ORDER — ONDANSETRON 2 MG/ML
4 INJECTION INTRAMUSCULAR; INTRAVENOUS EVERY 30 MIN PRN
Status: DISCONTINUED | OUTPATIENT
Start: 2022-06-27 | End: 2022-06-27 | Stop reason: HOSPADM

## 2022-06-27 RX ORDER — INDOMETHACIN 50 MG/1
100 SUPPOSITORY RECTAL
Status: CANCELLED | OUTPATIENT
Start: 2022-06-27

## 2022-06-27 RX ORDER — HYDROMORPHONE HCL IN WATER/PF 6 MG/30 ML
0.2 PATIENT CONTROLLED ANALGESIA SYRINGE INTRAVENOUS
Status: DISCONTINUED | OUTPATIENT
Start: 2022-06-27 | End: 2022-06-28 | Stop reason: HOSPADM

## 2022-06-27 RX ORDER — GLYCOPYRROLATE 0.2 MG/ML
INJECTION, SOLUTION INTRAMUSCULAR; INTRAVENOUS PRN
Status: DISCONTINUED | OUTPATIENT
Start: 2022-06-27 | End: 2022-06-27

## 2022-06-27 RX ORDER — EPINEPHRINE 1 MG/ML
INJECTION, SOLUTION, CONCENTRATE INTRAVENOUS PRN
Status: DISCONTINUED | OUTPATIENT
Start: 2022-06-27 | End: 2022-06-27 | Stop reason: HOSPADM

## 2022-06-27 RX ORDER — LIDOCAINE HYDROCHLORIDE 10 MG/ML
INJECTION, SOLUTION INFILTRATION; PERINEURAL PRN
Status: DISCONTINUED | OUTPATIENT
Start: 2022-06-27 | End: 2022-06-27

## 2022-06-27 RX ORDER — ONDANSETRON 4 MG/1
4 TABLET, ORALLY DISINTEGRATING ORAL EVERY 30 MIN PRN
Status: DISCONTINUED | OUTPATIENT
Start: 2022-06-27 | End: 2022-06-27 | Stop reason: HOSPADM

## 2022-06-27 RX ORDER — CLONAZEPAM 1 MG/1
2 TABLET ORAL AT BEDTIME
Status: DISCONTINUED | OUTPATIENT
Start: 2022-06-27 | End: 2022-06-28 | Stop reason: HOSPADM

## 2022-06-27 RX ORDER — IPRATROPIUM BROMIDE AND ALBUTEROL SULFATE 2.5; .5 MG/3ML; MG/3ML
3 SOLUTION RESPIRATORY (INHALATION) EVERY 4 HOURS PRN
Status: DISCONTINUED | OUTPATIENT
Start: 2022-06-27 | End: 2022-06-27 | Stop reason: HOSPADM

## 2022-06-27 RX ORDER — NALOXONE HYDROCHLORIDE 0.4 MG/ML
0.2 INJECTION, SOLUTION INTRAMUSCULAR; INTRAVENOUS; SUBCUTANEOUS
Status: DISCONTINUED | OUTPATIENT
Start: 2022-06-27 | End: 2022-06-28 | Stop reason: HOSPADM

## 2022-06-27 RX ORDER — ALBUTEROL SULFATE 90 UG/1
2 AEROSOL, METERED RESPIRATORY (INHALATION) EVERY 6 HOURS PRN
Status: DISCONTINUED | OUTPATIENT
Start: 2022-06-27 | End: 2022-06-27 | Stop reason: HOSPADM

## 2022-06-27 RX ORDER — FENTANYL CITRATE 50 UG/ML
25 INJECTION, SOLUTION INTRAMUSCULAR; INTRAVENOUS EVERY 5 MIN PRN
Status: DISCONTINUED | OUTPATIENT
Start: 2022-06-27 | End: 2022-06-27 | Stop reason: HOSPADM

## 2022-06-27 RX ORDER — LIDOCAINE 40 MG/G
CREAM TOPICAL
Status: DISCONTINUED | OUTPATIENT
Start: 2022-06-27 | End: 2022-06-28 | Stop reason: HOSPADM

## 2022-06-27 RX ORDER — PROPOFOL 10 MG/ML
INJECTION, EMULSION INTRAVENOUS PRN
Status: DISCONTINUED | OUTPATIENT
Start: 2022-06-27 | End: 2022-06-27

## 2022-06-27 RX ORDER — SODIUM CHLORIDE, SODIUM LACTATE, POTASSIUM CHLORIDE, CALCIUM CHLORIDE 600; 310; 30; 20 MG/100ML; MG/100ML; MG/100ML; MG/100ML
INJECTION, SOLUTION INTRAVENOUS CONTINUOUS
Status: DISCONTINUED | OUTPATIENT
Start: 2022-06-27 | End: 2022-06-27 | Stop reason: HOSPADM

## 2022-06-27 RX ORDER — ACETAMINOPHEN 650 MG/1
650 SUPPOSITORY RECTAL EVERY 6 HOURS PRN
Status: DISCONTINUED | OUTPATIENT
Start: 2022-06-27 | End: 2022-06-28 | Stop reason: HOSPADM

## 2022-06-27 RX ORDER — NALOXONE HYDROCHLORIDE 0.4 MG/ML
0.4 INJECTION, SOLUTION INTRAMUSCULAR; INTRAVENOUS; SUBCUTANEOUS
Status: DISCONTINUED | OUTPATIENT
Start: 2022-06-27 | End: 2022-06-28 | Stop reason: HOSPADM

## 2022-06-27 RX ORDER — POTASSIUM CHLORIDE 7.45 MG/ML
10 INJECTION INTRAVENOUS
Status: COMPLETED | OUTPATIENT
Start: 2022-06-27 | End: 2022-06-27

## 2022-06-27 RX ORDER — NALOXONE HYDROCHLORIDE 0.4 MG/ML
0.4 INJECTION, SOLUTION INTRAMUSCULAR; INTRAVENOUS; SUBCUTANEOUS
Status: DISCONTINUED | OUTPATIENT
Start: 2022-06-27 | End: 2022-06-27 | Stop reason: HOSPADM

## 2022-06-27 RX ORDER — SODIUM CHLORIDE 9 MG/ML
INJECTION, SOLUTION INTRAVENOUS CONTINUOUS
Status: DISCONTINUED | OUTPATIENT
Start: 2022-06-27 | End: 2022-06-28 | Stop reason: HOSPADM

## 2022-06-27 RX ORDER — LIDOCAINE 40 MG/G
CREAM TOPICAL
Status: CANCELLED | OUTPATIENT
Start: 2022-06-27

## 2022-06-27 RX ORDER — INDOMETHACIN 50 MG/1
100 SUPPOSITORY RECTAL
Status: DISCONTINUED | OUTPATIENT
Start: 2022-06-27 | End: 2022-06-27 | Stop reason: HOSPADM

## 2022-06-27 RX ADMIN — PIPERACILLIN AND TAZOBACTAM 3.38 G: 3; .375 INJECTION, POWDER, LYOPHILIZED, FOR SOLUTION INTRAVENOUS at 21:21

## 2022-06-27 RX ADMIN — SODIUM CHLORIDE: 9 INJECTION, SOLUTION INTRAVENOUS at 12:07

## 2022-06-27 RX ADMIN — POTASSIUM CHLORIDE 10 MEQ: 7.45 INJECTION INTRAVENOUS at 13:26

## 2022-06-27 RX ADMIN — PIPERACILLIN AND TAZOBACTAM 3.38 G: 3; .375 INJECTION, POWDER, LYOPHILIZED, FOR SOLUTION INTRAVENOUS at 05:36

## 2022-06-27 RX ADMIN — FENTANYL CITRATE 50 MCG: 50 INJECTION, SOLUTION INTRAMUSCULAR; INTRAVENOUS at 16:19

## 2022-06-27 RX ADMIN — LIDOCAINE HYDROCHLORIDE 50 MG: 10 INJECTION, SOLUTION INFILTRATION; PERINEURAL at 16:19

## 2022-06-27 RX ADMIN — PROPOFOL 180 MG: 10 INJECTION, EMULSION INTRAVENOUS at 16:19

## 2022-06-27 RX ADMIN — POTASSIUM CHLORIDE 10 MEQ: 7.45 INJECTION INTRAVENOUS at 10:01

## 2022-06-27 RX ADMIN — Medication 140 MG: at 16:19

## 2022-06-27 RX ADMIN — FLUTICASONE FUROATE AND VILANTEROL TRIFENATATE 1 PUFF: 100; 25 POWDER RESPIRATORY (INHALATION) at 13:29

## 2022-06-27 RX ADMIN — ONDANSETRON 4 MG: 2 INJECTION INTRAMUSCULAR; INTRAVENOUS at 16:29

## 2022-06-27 RX ADMIN — SODIUM CHLORIDE, POTASSIUM CHLORIDE, SODIUM LACTATE AND CALCIUM CHLORIDE: 600; 310; 30; 20 INJECTION, SOLUTION INTRAVENOUS at 15:08

## 2022-06-27 RX ADMIN — IPRATROPIUM BROMIDE AND ALBUTEROL SULFATE 3 ML: 2.5; .5 SOLUTION RESPIRATORY (INHALATION) at 10:34

## 2022-06-27 RX ADMIN — PHENYLEPHRINE HYDROCHLORIDE 50 MCG: 10 INJECTION INTRAVENOUS at 16:52

## 2022-06-27 RX ADMIN — DEXAMETHASONE SODIUM PHOSPHATE 10 MG: 10 INJECTION, SOLUTION INTRAMUSCULAR; INTRAVENOUS at 16:29

## 2022-06-27 RX ADMIN — POTASSIUM CHLORIDE 10 MEQ: 7.45 INJECTION INTRAVENOUS at 12:06

## 2022-06-27 RX ADMIN — FENTANYL CITRATE 50 MCG: 50 INJECTION, SOLUTION INTRAMUSCULAR; INTRAVENOUS at 16:40

## 2022-06-27 RX ADMIN — ALBUTEROL SULFATE 2 PUFF: 90 AEROSOL, METERED RESPIRATORY (INHALATION) at 13:35

## 2022-06-27 RX ADMIN — POTASSIUM CHLORIDE 10 MEQ: 7.45 INJECTION INTRAVENOUS at 10:59

## 2022-06-27 RX ADMIN — CLONAZEPAM 2 MG: 1 TABLET ORAL at 22:33

## 2022-06-27 RX ADMIN — GLYCOPYRROLATE 0.1 MG: 0.2 INJECTION, SOLUTION INTRAMUSCULAR; INTRAVENOUS at 16:52

## 2022-06-27 ASSESSMENT — COPD QUESTIONNAIRES: COPD: 1

## 2022-06-27 ASSESSMENT — ACTIVITIES OF DAILY LIVING (ADL)
ADLS_ACUITY_SCORE: 35
ADLS_ACUITY_SCORE: 24
ADLS_ACUITY_SCORE: 35
ADLS_ACUITY_SCORE: 24
ADLS_ACUITY_SCORE: 24

## 2022-06-27 ASSESSMENT — MIFFLIN-ST. JEOR: SCORE: 1395.5

## 2022-06-27 NOTE — ANESTHESIA PREPROCEDURE EVALUATION
Anesthesia Pre-Procedure Evaluation    Patient: You Montano   MRN: [id number] : 1950        Procedure : Procedure(s):  ENDOSCOPIC RETROGRADE CHOLANGIOPANCREATOGRAPHY          Past Medical History:   Diagnosis Date     COPD (chronic obstructive pulmonary disease) (H)       Past Surgical History:   Procedure Laterality Date     ORIF PROXIMAL TIBIOFIBULAR JOINT Right      ZZC TOTAL HIP ARTHROPLASTY Right 2020    Procedure: ARTHROPLASTY, HIP, TOTAL, DIRECT ANTERIOR APPROACH RIGHT;  Surgeon: Jesus Cesar MD;  Location: [facility];  Service: Orthopedics      No Known Allergies   Social History     Tobacco Use     Smoking status: Former Smoker     Smokeless tobacco: Never Used   Substance Use Topics     Alcohol use: Not Currently      Wt Readings from Last 1 Encounters:   22 70.3 kg (155 lb)        Anesthesia Evaluation   Pt has had prior anesthetic. Type: Regional.        ROS/MED HX  ENT/Pulmonary: Comment: + COVID 19    (+) COPD,     Neurologic:  - neg neurologic ROS     Cardiovascular:  - neg cardiovascular ROS     METS/Exercise Tolerance:     Hematologic:  - neg hematologic  ROS     Musculoskeletal:  - neg musculoskeletal ROS     GI/Hepatic:  - neg GI/hepatic ROS     Renal/Genitourinary:       Endo:  - neg endo ROS     Psychiatric/Substance Use:  - neg psychiatric ROS     Infectious Disease:  - neg infectious disease ROS     Malignancy:  - neg malignancy ROS     Other:            Physical Exam    Airway  airway exam normal           Respiratory Devices and Support         Dental  no notable dental history         Cardiovascular   cardiovascular exam normal          Pulmonary   pulmonary exam normal                OUTSIDE LABS:  CBC:   Lab Results   Component Value Date    WBC 4.3 2022    WBC 6.8 2022    HGB 11.4 (L) 2022    HGB 12.9 (L) 2022    HCT 33.4 (L) 2022    HCT 38.2 (L) 2022     (L) 2022     (L) 2022     BMP:    Lab Results   Component Value Date     06/27/2022     06/26/2022    POTASSIUM 3.3 (L) 06/27/2022    POTASSIUM 3.3 (L) 06/26/2022    CHLORIDE 111 (H) 06/27/2022    CHLORIDE 104 06/26/2022    CO2 23 06/27/2022    CO2 21 (L) 06/26/2022    BUN 13 06/27/2022    BUN 16 06/26/2022    CR 0.71 06/27/2022    CR 0.79 06/26/2022     06/27/2022     (H) 06/26/2022     COAGS:   Lab Results   Component Value Date    PTT 30 12/03/2020    INR 1.14 (H) 12/03/2020     POC: No results found for: BGM, HCG, HCGS  HEPATIC:   Lab Results   Component Value Date    ALBUMIN 2.3 (L) 06/27/2022    PROTTOTAL 5.7 (L) 06/27/2022     (H) 06/27/2022     (H) 06/27/2022    ALKPHOS 276 (H) 06/27/2022    BILITOTAL 4.4 (H) 06/27/2022     OTHER:   Lab Results   Component Value Date    SIMON 8.0 (L) 06/27/2022    LIPASE 130 (H) 06/26/2022    TSH 0.99 11/05/2020       Anesthesia Plan    ASA Status:  3      Anesthesia Type: General.     - Airway: ETT   Induction: Intravenous.   Maintenance: Balanced.        Consents    Anesthesia Plan(s) and associated risks, benefits, and realistic alternatives discussed. Questions answered and patient/representative(s) expressed understanding.     - Discussed: Risks, Benefits and Alternatives for BOTH SEDATION and the PROCEDURE were discussed     - Discussed with:  Patient         Postoperative Care    Pain management: Multi-modal analgesia.   PONV prophylaxis: Ondansetron (or other 5HT-3), Dexamethasone or Solumedrol     Comments:                Gerry Garduno MD

## 2022-06-27 NOTE — PLAN OF CARE
Problem: Plan of Care - These are the overarching goals to be used throughout the patient stay.    Goal: Optimal Comfort and Wellbeing  Outcome: Ongoing, Progressing   Goal Outcome Evaluation:    Pt VSS, is A&O X4. States being worried about bilirubin obstruction. Pt is independent in room, no bed alarm set. Lung sounds are present distant wheezes. Pt denies SOB, nausea or numbness and tingling. He now has continuous NS running at 125 ml/hr. He is on NPO pending ERCP at [place].

## 2022-06-27 NOTE — ANESTHESIA CARE TRANSFER NOTE
Patient: You Montano    Procedure: Procedure(s):  ENDOSCOPIC RETROGRADE CHOLANGIOPANCREATOGRAPHY, BILIARY SPHINCTEROTOMY, STONE EXTRACTION, SCLEROTHERAPY WITH EPI FOR BLEEDING, BILIARY STENT PLACEMENT       Diagnosis: Biliary obstruction [K83.1]  Diagnosis Additional Information: No value filed.    Anesthesia Type:   General     Note:    Oropharynx: oropharynx clear of all foreign objects and spontaneously breathing  Level of Consciousness: awake  Oxygen Supplementation: face mask  Level of Supplemental Oxygen (L/min / FiO2): 8  Independent Airway: airway patency satisfactory and stable  Dentition: dentition unchanged  Vital Signs Stable: post-procedure vital signs reviewed and stable  Report to RN Given: handoff report given  Patient transferred to: PACU (recovering in OR d/t covid)  Comments: Volatile agents turned off, no relaxant to reverse, 4/4 twitches with sustained tetany. Pt breathing spontaneously with adequate tidal volumes, following commands, gently suctioned oropharynx, extubated without issue. 10LPM O2 applied via face mask.Waited 18 minutes in OR after extubation and PACU RN arrived in OR to recover pt.  Handoff Report: Identifed the Patient, Identified the Reponsible Provider, Reviewed the pertinent medical history, Discussed the surgical course, Reviewed Intra-OP anesthesia mangement and issues during anesthesia, Set expectations for post-procedure period and Allowed opportunity for questions and acknowledgement of understanding      Vitals:  Vitals Value Taken Time   BP     Temp     Pulse     Resp     SpO2         Electronically Signed By: LEANDRA Ryan CRNA  June 27, 2022  5:33 PM

## 2022-06-27 NOTE — UTILIZATION REVIEW
Admission Status; Secondary Review Determination       Under the authority of the Utilization Management Committee, the utilization review process indicated a secondary review on the above patient. The review outcome is based on review of the medical records, discussions with staff, and applying clinical experience noted on the date of the review.     (x) Inpatient Status Appropriate - This patient's medical care is consistent with medical management for inpatient care and reasonable inpatient medical practice.     RATIONALE FOR DETERMINATION     Mr. Montano is a 70 yo male pt who presents to the ED with cough, fevers and elevated LFT's.  Imaging revealed a dilated CBD and elevated total bilirubin and elevated lipase; remains on IV zosyn q6hr for concern over ascending cholangitis.  He is also noted to be COVID positive and this viral infection suspected to also be contributing to acute elevation in LFT's.  He remains NPO, IVF, IV abx.  GI and gen surgery consults requested; planning for ERCP later today.  Lap jose recommended but pt, so far, declining.  He is remaining in the hospital today for ongoing inpatient treatment and monitoring.     At the time of admission with the information available to the attending physician more than 2 nights Hospital complex care was anticipated and documented in the H&P.  Based on patient risk of adverse outcome if treated as outpatient and complex care required. Inpatient admission is appropriate based on the Medicare guidelines.     The information on this document is developed by the utilization review team in order for the business office to ensure compliance. This only denotes the appropriateness of proper admission status and does not reflect the quality of care rendered.   The definitions of Inpatient Status and Observation Status used in making the determination above are those provided in the CMS Coverage Manual, Chapter 1 and Chapter 6, section 70.4.         Sincerely,      Melissa Hartley, DO  Utilization Review  Physician Advisor  [facility].

## 2022-06-27 NOTE — CONSULTS
"[facility] Consultation     You Montano  [address] [place] [address]  71 year old male     Admission Date/Time: 6/26/2022  Primary Care Provider: Vincenzo Malik  Referring / Attending Physician:  Aggie     We were asked to see the patient in consultation by Dr. Martin for evaluation of \"cholangitis, cbd 10 mm on ultrasoud, TB 7.\"       CC: cough     HPI:  You Montano is a 71 year old male with PMH COPD, macular degeneration, GERD who presented to ER 6/26 per the recommendation of his wife for cough. He has had a cough with fever x 5 days. He was found to have elevated LFTs in ER and subsequent ultrasound shows CBD dilation. He reports intermittent pain but cannot clarify how long it has been going on or how frequent. He denies direct association with eating but states it is random and mild. Currently he denies any pain, nausea/vomiting.      ROS: A comprehensive ten point review of systems was negative aside from those in mentioned in the HPI.      PAST MEDICAL HISTORY:  Patient Active Problem List    Diagnosis Date Noted     Cholangitis 06/26/2022     Priority: Medium     Biliary obstruction 06/26/2022     Priority: Medium     COVID-19 06/26/2022     Priority: Medium     History of colonic polyps 12/10/2020     Priority: Medium     Overview:   tubulovillous adenoma 1/2016         Episodic memory loss 12/10/2020     Priority: Medium     Closed displaced fracture of right femoral neck (H) 12/01/2020     Priority: Medium     Closed fracture of neck of right femur, initial encounter (H) 12/01/2020     Priority: Medium     Added automatically from request for surgery [id number]         Chronic obstructive pulmonary disease, unspecified COPD type (H) 11/05/2020     Priority: Medium     1/29/19  FEV1/FVC is 47% and is reduced.  FEV1 is 1.17L (40%) predicted and is reduced.  FVC is 2.49L (66%) predicted and reduced.  There was no improvement in spirometry after a single inhaled dose of " "  bronchodilator.  TLC is 6.30L (98%) predicted and is normal.  RV is 4.34L (172%) predicted and is increased.  DLCO is 11.80ml/min/hg (47%) predicted and is reduced when it is corrected   for hemoglobin.  Flow volume loops indicate scooping of the expiratory limb.  Impression:  Full Pulmonary Function Test is abnormal.  PFTs are   consistent with severe obstructive disease.  Spirometry is not consistent with reversibility.  There is no hyperinflation.  There is air-trapping.  Diffusion capacity when corrected for hemoglobin is severely reduced.  Payton Seeqvi  Pulmonary and Critical Care  2346         Parasomnia, unspecified 07/11/2018     Priority: Medium     Low libido 07/11/2018     Priority: Medium     Macular degeneration (senile) of retina 07/11/2018     Priority: Medium     Corns and callus 08/05/2015     Priority: Medium     Elevated PSA 08/05/2015     Priority: Medium     Medicare annual wellness visit, initial 08/05/2015     Priority: Medium     Nicotine dependence 08/05/2015     Priority: Medium     SOCIAL HISTORY:  Social History     Tobacco Use     Smoking status: Former Smoker     Smokeless tobacco: Never Used   Former tobacco use.     FAMILY HISTORY:  History reviewed. No pertinent family history.   No pertinent family history.     ALLERGIES: No Known Allergies  MEDICATIONS:   Current Facility-Administered Medications   Medication     melatonin tablet 1 mg     ondansetron (ZOFRAN ODT) ODT tab 4 mg    Or     ondansetron (ZOFRAN) injection 4 mg     piperacillin-tazobactam (ZOSYN) 3.375 g vial to attach to  mL bag     sodium chloride 0.9% infusion     PHYSICAL EXAM:   /61 (BP Location: Left arm, Patient Position: Right side, Cuff Size: Adult Regular)   Pulse 71   Temp 98.3  F (36.8  C) (Oral)   Resp 16   Ht 1.676 m (5' 6\")   Wt 70.3 kg (155 lb)   SpO2 95%   BMI 25.02 kg/m     GEN: NAD, older male appears stated age lying in bed  HEENT: slight icterus, no lymphadenopathy  HRT: " RRR  LUNGS: CTA  ABD: +BS, soft, nontender  SKIN: No rash, jaundice  MSKL: no LE edema, strength 5/5 all 4 extrems  NEURO: Alert and oriented, appropriate mood and affect     ADDITIONAL DATA:   I reviewed the patient's new clinical lab test results.   Recent Labs   Lab Test 06/26/22  1048 12/04/20  0606 12/03/20  0559 12/02/20  1803 12/02/20  0549   WBC 6.8  --   --  9.4 6.9   HGB 12.9* 11.0* 11.1* 13.7* 14.2   MCV 90  --   --  95 94   *  --   --  143 140   INR  --   --  1.14* 1.06 1.07     Recent Labs   Lab Test 06/26/22  1048 12/02/20  1803 12/02/20  0549 12/01/20  1917   POTASSIUM 3.3* 4.0 3.7 3.9   CHLORIDE 104  --  110* 105   CO2 21*  --  23 23   BUN 16  --  19 19   ANIONGAP 11  --  8 13     Recent Labs   Lab Test 06/26/22  1836 06/26/22  1236 06/26/22  1048 12/01/20  1917 11/05/20  1159   ALBUMIN  --   --  2.7* 3.9 4.0   BILITOTAL  --   --  7.3* 0.9 0.4   ALT  --   --  228* 20 15   AST  --   --  160* 27 20   PROTEIN  --  30 *  --   --   --    LIPASE 130*  --   --   --   --      SARS CoV2 PCR 6/26/22: positive     Imaging results:  RUQ ultrasound 6/26/22:  FINDINGS:  GALLBLADDER: There is gallbladder wall edema with internal sludge and questionable punctate stones. No pathologic luminal distention. Questionable trace pericholecystic fluid.  BILE DUCTS: There is intra and extrahepatic biliary ductal dilation. The common duct measures 10 mm. No choledocholithiasis in the visualized portions of the common duct.  LIVER: Likely hepatic steatosis with smooth contour. No focal mass. Simple appearing right hepatic cyst, no specific follow-up recommended.  RIGHT KIDNEY: No hydronephrosis.  PANCREAS: The visualized portions are normal.  No ascites.                                                                   IMPRESSION:  1.  Findings suspicious for biliary obstruction. No choledocholithiasis or other etiology for obstruction in the visualized portions of the common duct.  2.  Gallbladder wall edema with  likely internal sludge and questionable trace pericholecystic fluid. Favor findings are secondary to #1 given lack of pathologic luminal distention.  3.  Likely hepatic steatosis.    CXR 6/26/22:  IMPRESSION: Heart size and vascularity are normal. Calcified bilateral granulomata redemonstrated. Shallow inspiration accentuates bibasilar subsegmental atelectasis/scar. No focal consolidation, pneumothorax nor pleural effusion.        ASSESSMENT:    Likely biliary obstruction  Cough due to covid-19  This is a 70 y/o male with PMH COPD, macular degeneration, GERD admitted 6/26 for cough due to covid-19 and suspected biliary obstruction after presenting with cough and fever x 5 days. Liver tests found to be elevated and US shows cholelithiasis with  CBD dilation without obvious choledocholithiasis. WBC in normal range, no fevers or abdominal pain. Given likely choledocholithiasis he will be transferred to [facility] for ERCP, tentatively this afternoon. He is on abx. Discussed risks of procedure with patient and he is agreeable. He seems overwhelmed but understands the situation. Discussed patient with surgery PA and Hospitalist and surgery consult planned to consider cholecystectomy.     PLAN:  -  NPO  - Transfer to [facility] for ERCP with Dr. Willis today at 3:40 pm  - Surgery consult to consider cholecystectomy  - Continue abx  - Supportive cares per medicine        Leti Tuttle PA-C  [facility]  6/27/2022 8:04 AM  [phone] (office)    This case was discussed with Dr. Colbert, GI staff who agrees to the above assessment and plan.    Approximately 35 minutes of total time was spent providing patient care, including patient evaluation, reviewing documentation/test result, and .   ________________________________________________________________________

## 2022-06-27 NOTE — DISCHARGE SUMMARY
"[facility] Medicine PROGRESS NOTE      Identification/Summary:      You Montano is a 71 year old male with a past medical history of COPD, dyslipidemia who was admitted on 6/26/2022 for abdominal discomfort, found to have intra and extrahepatic biliary ductal dilation without any obstructing stone, mild pericholecystic fluid.  Seen by gastroenterology, ERCP 6/27/2022 at [facility].  Patient not keen on getting cholecystectomy, risks and benefits were discussed with the patient.    Assessment & Plan      Biliary ductal dilatation of the probable ascending cholangitis  Mildly elevated lipase without pancreatitis  N.p.o., IV fluids  Zosyn, blood cultures pending  GI consult, ERCP 6/27/2022, discussed with GI  Trend LFTs    COVID-19 infection  Chest x-ray negative, has a cough  SPO2 normal  Continue to monitor    COPD  No evidence of exacerbation  Continue home inhalers    Dyslipidemia: Lipitor    Hypokalemia: Replete per protocol  Mild hypocalcemia, no treatment indicated as collected levels are normal  Hypoalbuminemia  Mild anemia: Hemoglobin 11.4, repeat testing in a.m.  Thrombocytopenia: Likely related to cholangitis, trend  Clinically Significant Risk Factors Present on Admission             # Hypoalbuminemia: Albumin = 2.3 g/dL (Ref range: 3.5 - 5.0 g/dL) on admission, will monitor as appropriate    # Thrombocytopenia: Plts = 106 10e3/uL (Ref range: 150 - 450 10e3/uL) on admission, will monitor for bleeding     # Overweight: Estimated body mass index is 25.02 kg/m  as calculated from the following:    Height as of this encounter: 1.676 m (5' 6\").    Weight as of this encounter: 70.3 kg (155 lb).           Diet: NPO for Medical/Clinical Reasons Except for: Meds, Ice Chips  DVT Prophylaxis: Lovenox prophylactic dose start after procedure   Leal Catheter: Not present  Central Lines: None    Code Status: No CPR- Do NOT Intubate    Discharge Planning   Anticipated Discharge in :2 days " "  Expected Discharge Destination: Home   Milestones/Criteria For Discharge:- ERCP   Disposition Plan      Expected Discharge Date: 06/28/2022      Destination: home with family  Discharge Comments: Pt may need to transfer to [place] for ERCP.        The patient's care was discussed with the Bedside Nurse, Patient and Patient's Family.      Interval History/Subjective:     No abdominal pain or nausea or vomiting or dyspnea  Has chronic VARMA due to COPD   No chest pain   No h/o cardiac disease    Physical Exam/Objective:     Vitals I/O   Vital signs:  Temp: 97.9  F (36.6  C) Temp src: Oral BP: (!) 145/67 Pulse: 71   Resp: 16 SpO2: 95 % O2 Device: None (Room air)   Height: 167.6 cm (5' 6\") Weight: 70.3 kg (155 lb)  Estimated body mass index is 25.02 kg/m  as calculated from the following:    Height as of this encounter: 1.676 m (5' 6\").    Weight as of this encounter: 70.3 kg (155 lb).       I/O last 3 completed shifts:  In: 736 [I.V.:736]  Out: -      Vitals:    06/26/22 1005   Weight: 70.3 kg (155 lb)      Weight change:    Body mass index is 25.02 kg/m .    General: Awake alert and comfortable  Lungs: CTA without rales or rhonchi  Heart: S1, S2 without murmurs  Abdomen: Soft nontender, bowel sounds positive  CNS: Nonfocal  Extremities: No edema      Medications:     Medications     sodium chloride 125 mL/hr at 06/26/22 2027       piperacillin-tazobactam  3.375 g Intravenous Q8H       Data Reviewed   I personally reviewed all new medications, labs, imaging/diagnostics reports over the past 24 hours.     Pertinent findings include.     Labs    Recent Labs   Lab 06/27/22  0803 06/26/22  1836 06/26/22  1048   WBC 4.3  --  6.8   HGB 11.4*  --  12.9*   MCV 91  --  90   *  --  118*     --  136   POTASSIUM 3.3*  --  3.3*   CHLORIDE 111*  --  104   CO2 23  --  21*   BUN 13  --  16   CR 0.71  --  0.79   ANIONGAP 7  --  11   SIMON 8.0*  --  8.5     --  151*   ALBUMIN 2.3*  --  2.7*   PROTTOTAL 5.7*  --  6.4 "   BILITOTAL 4.4*  --  7.3*   ALKPHOS 276*  --  334*   *  --  228*   *  --  160*   LIPASE  --  130*  --        Imaging   Recent Results (from the past 24 hour(s))   XR Chest Port 1 View    Narrative    EXAM: XR CHEST PORT 1 VIEW  LOCATION: [facility]  DATE/TIME: 6/26/2022 10:43 AM    INDICATION: Cough.  COMPARISON: 02/17/2022      Impression    IMPRESSION: Heart size and vascularity are normal. Calcified bilateral granulomata redemonstrated. Shallow inspiration accentuates bibasilar subsegmental atelectasis/scar. No focal consolidation, pneumothorax nor pleural effusion.   Abdomen US, limited (RUQ only)    Narrative    EXAM: US ABDOMEN LIMITED  LOCATION: [facility]  DATE/TIME: 6/26/2022 2:32 PM    INDICATION: Elevated liver enzymes and obstructive pattern  COMPARISON: None.  TECHNIQUE: Limited abdominal ultrasound.    FINDINGS:    GALLBLADDER: There is gallbladder wall edema with internal sludge and questionable punctate stones. No pathologic luminal distention. Questionable trace pericholecystic fluid.    BILE DUCTS: There is intra and extrahepatic biliary ductal dilation. The common duct measures 10 mm. No choledocholithiasis in the visualized portions of the common duct.    LIVER: Likely hepatic steatosis with smooth contour. No focal mass. Simple appearing right hepatic cyst, no specific follow-up recommended.    RIGHT KIDNEY: No hydronephrosis.    PANCREAS: The visualized portions are normal.    No ascites.      Impression    IMPRESSION:  1.  Findings suspicious for biliary obstruction. No choledocholithiasis or other etiology for obstruction in the visualized portions of the common duct.  2.  Gallbladder wall edema with likely internal sludge and questionable trace pericholecystic fluid. Favor findings are secondary to #1 given lack of pathologic luminal distention.  3.  Likely hepatic steatosis.             EKG:      Mackenzie Marcial MD  Internal  Medicine / [facility] medicine   [facility]  Securely message with the [employer] Web Console (learn more here)  Text page via [employer] ([employer])

## 2022-06-27 NOTE — CONSULTS
Care Management Initial Consult    General Information  Assessment completed with: Spouse or significant other,    Type of CM/SW Visit: Initial Assessment  Primary Care Provider verified and updated as needed: Yes   Readmission within the last 30 days: no previous admission in last 30 days   Reason for Consult: discharge planning, health care directive, transportation  Advance Care Planning: Advance Care Planning Reviewed: concerns discussed        Communication Assessment  Patient's communication style: spoken language (English or Bilingual)    Hearing Difficulty or Deaf: no   Wear Glasses or Blind: yes    Cognitive  Cognitive/Neuro/Behavioral: WDL                      Living Environment:   People in home: spouse     Current living Arrangements: house      Able to return to prior arrangements: yes     Family/Social Support:  Care provided by: self, spouse/significant other (Wife assists as needed.)  Provides care for: no one, unable/limited ability to care for self  Marital Status:   Wife  Marni WATKINS       Description of Support System: Involved, Supportive    Support Assessment: Adequate family and caregiver support, Adequate social supports    Current Resources:   Patient receiving home care services: No  Community Resources: None  Equipment currently used at home: cane, straight, grab bar, tub/shower, wheelchair, manual  Supplies currently used at home: Oxygen Tubing/Supplies, Other (CPAP machine and associated tubing. O2 is owned privately.)    Employment/Financial:  Employment Status: retired     Financial Concerns: No concerns identified   Referral to Financial Worker: No     Lifestyle & Psychosocial Needs:  Social Determinants of Health     Tobacco Use: Medium Risk     Smoking Tobacco Use: Former Smoker     Smokeless Tobacco Use: Never Used   Alcohol Use: Not on file   Financial Resource Strain: Not on file   Food Insecurity: Not on file   Transportation Needs: Not on file   Physical Activity: Not on  "file   Stress: Not on file   Social Connections: Not on file   Intimate Partner Violence: Not on file   Depression: Not at risk     PHQ-2 Score: 0   Housing Stability: Not on file     Functional Status:  Prior to admission patient needed assistance:   Dependent ADLs:: Ambulation-cane (Ambulates without the cane when feeling well.)  Dependent IADLs:: Transportation (Pt is only allowed to drive short distances in daylight for safety.)  Assessment of Functional Status: Not at  functional baseline    Mental Health Status:  Mental Health Status: No Current Concerns       Chemical Dependency Status:  Chemical Dependency Status: No Current Concerns           Values/Beliefs:  Spiritual, Cultural Beliefs, Rastafari Practices, Values that affect care:  (None stated by wife.)             Additional Information:  Writer spoke to pt's wife Marni WATKINS(NW) by phone to introduce Care Management service(CM) and obtain an initial assessment. NW states sharing a house with the pt and that he is I/ADL independent when feeling well. Wife provides assistance when needed during times of pt fatigue. Wife has independently acquired O2 therapy equipment to utilize at home as needed.    6/26 per SD Kebede-\"71 year old male who presents for evaluation of fever and cough. Patient reports a cough and fever that began 5 days ago. Endorses concentrated urine and one episode of urinary incontinence. Patient has also been feeling generally weak and fatigued and has had a decreased appetite and fluid intake. Patient presents with his wife who was sick with respiratory symptoms last week. She has since cleared her symptoms.\"     No pending consults. Anticipate improvement and return home to the care of his wife as needed. Wife to transport. CM to follow for changes in current anticipated discharge disposition.    Evans Sims RN        "

## 2022-06-27 NOTE — CONSULTS
General Surgery Consultation  You Montano MRN# [id number]   Age/Sex: 71 year old male YOB: 1950     Reason for consult: 1. Cholangitis    2. Biliary obstruction    3. COVID-19            Requesting physician: Leti Tuttle PA-C                   Assessment and Plan:   Assessment:  Choledocholithiasis  Cholelithiasis    Plan:  Reviewed exam, lab, and imaging findings with her. Findings consistent with choledocholithiasis and cholelithiasis. GI planning ERCP today at [facility]. Discussed recommendation of laparoscopic cholecystectomy with him and attempted to call his wife x2 without success. This would be elective and more of a preventative measure. At this time, he would like to hold off on surgery and consider in the future. We will continue to follow and see what is found with the ERCP.        Robel Macedo PA-C  Pager - [phone]  Phone - [phone]   General Surgery           Chief Complaint:     Chief Complaint   Patient presents with     Cough     Fever        History is obtained from the patient    HPI:   You Montano is a 71 year old male who presents with cough and fevers for the last 5 days. He presented to the ED and found to have elevated LFTs and US showed choledocholithiasis and cholelithiasis. GI planning ERCP later today. He currently denies any pain or n/v. He would like to hold off on surgery for now, but asked that I call and discuss with his wife.     He denies any previous abdominal surgeries.          Past Medical History:   History reviewed. No pertinent past medical history.           Past Surgical History:     Past Surgical History:   Procedure Laterality Date     ORIF PROXIMAL TIBIOFIBULAR JOINT Right      ZZC TOTAL HIP ARTHROPLASTY Right 12/2/2020    Procedure: ARTHROPLASTY, HIP, TOTAL, DIRECT ANTERIOR APPROACH RIGHT;  Surgeon: Jesus Cesar MD;  Location: [facility];  Service: Orthopedics             Social History:    reports that he  has quit smoking. He has never used smokeless tobacco.           Family History:   History reviewed. No pertinent family history.           Allergies:   No Known Allergies           Medications:     Prior to Admission medications    Medication Sig Start Date End Date Taking? Authorizing Provider   acetaminophen (TYLENOL) 325 MG tablet Take 975 mg by mouth every 8 hours as needed for mild pain   Yes Unknown, Entered By History   albuterol (PROAIR HFA;PROVENTIL HFA;VENTOLIN HFA) 90 mcg/actuation inhaler [ALBUTEROL (PROAIR HFA;PROVENTIL HFA;VENTOLIN HFA) 90 MCG/ACTUATION INHALER] Inhale 2 puffs every 6 (six) hours as needed for wheezing. 12/1/20  Yes Vincenzo Malik MD   atorvastatin (LIPITOR) 10 MG tablet [ATORVASTATIN (LIPITOR) 10 MG TABLET] Take 1 tablet (10 mg total) by mouth daily. 11/5/20  Yes Vincenzo Malik MD   budesonide-formoteroL (SYMBICORT) 80-4.5 mcg/actuation inhaler [BUDESONIDE-FORMOTEROL (SYMBICORT) 80-4.5 MCG/ACTUATION INHALER] Inhale 2 puffs 2 (two) times a day. 4/21/21  Yes Vincenzo Malik MD   clonazePAM (KLONOPIN) 2 MG tablet [CLONAZEPAM (KLONOPIN) 2 MG TABLET] Take 2 mg by mouth at bedtime.  5/31/18  Yes Provider, Historical   famotidine (PEPCID) 40 MG tablet TAKE 1 TABLET BY MOUTH DAILY. 11/11/21  Yes Vincenzo Malik MD   ipratropium - albuterol 0.5 mg/2.5 mg/3 mL (DUONEB) 0.5-2.5 (3) MG/3ML neb solution INHALE THE CONTENTS OF ONE VIAL VIA NEBULIZER EVERY 6 HOURS AS NEEDED 4/27/22  Yes Vincenzo Malik MD   Melatonin 10 MG TABS tablet Take 10 mg by mouth At Bedtime   Yes Unknown, Entered By History   Multiple Vitamins-Minerals (ICAPS AREDS FORMULA PO) Take 1 capsule by mouth 2 times daily   Yes Unknown, Entered By History   sildenafil (REVATIO) 20 mg tablet [SILDENAFIL (REVATIO) 20 MG TABLET] Take 2-3 tabs PO 1 hr before sex 8/6/19  Yes Vincenzo Malik MD              Review of Systems:   The Review of Systems is negative other than noted in the HPI            Physical Exam:     Patient Vitals  "for the past 24 hrs:   BP Temp Temp src Pulse Resp SpO2 Height Weight   06/27/22 0825 (!) 145/67 97.9  F (36.6  C) Oral 71 16 95 % -- --   06/27/22 0500 123/61 98.3  F (36.8  C) Oral 71 16 95 % -- --   06/27/22 0015 126/63 98.7  F (37.1  C) Oral 66 20 94 % -- --   06/26/22 2034 133/65 98.4  F (36.9  C) Oral 65 18 96 % -- --   06/26/22 1844 133/61 (!) 96.6  F (35.9  C) Oral 69 16 95 % -- --   06/26/22 1700 -- -- -- 60 -- 96 % -- --   06/26/22 1630 113/56 -- -- 61 -- 100 % -- --   06/26/22 1345 -- -- -- 66 -- 95 % -- --   06/26/22 1330 106/58 -- -- 66 -- 95 % -- --   06/26/22 1300 -- -- -- 68 -- 96 % -- --   06/26/22 1230 125/61 -- -- -- -- -- -- --   06/26/22 1100 130/56 -- -- 80 -- 95 % -- --   06/26/22 1045 -- -- -- 83 -- 94 % -- --   06/26/22 1030 114/59 -- -- 82 -- 94 % -- --   06/26/22 1015 126/63 98.9  F (37.2  C) Oral 86 -- 95 % -- --   06/26/22 1005 133/61 99.5  F (37.5  C) Oral 87 16 95 % 1.676 m (5' 6\") 70.3 kg (155 lb)          Intake/Output Summary (Last 24 hours) at 6/27/2022 1003  Last data filed at 6/27/2022 0500  Gross per 24 hour   Intake 736 ml   Output --   Net 736 ml      Constitutional:   awake, alert, cooperative, no apparent distress, and appears stated age       Eyes:   PERRL, conjunctiva/corneas clear, EOM's intact; no scleral edema or icterus noted        ENT:   Normocephalic, without obvious abnormality, atraumatic, Lips, mucosa, and tongue normal        Hematologic / Lymphatic:   No lymphadenopathy       Lungs:   Normal respiratory effort, no accessory muscle use       Cardiovascular:   Regular rate and rhythm       Abdomen:   Soft, nondistended, nontender        Musculoskeletal:   No obvious swelling, bruising or deformity       Skin:   Skin color and texture normal for patient, no rashes or lesions              Data:        Admission on 06/26/2022   Component Date Value     Sodium 06/26/2022 136      Potassium 06/26/2022 3.3 (A)     Chloride 06/26/2022 104      Carbon Dioxide (CO2) " 06/26/2022 21 (A)     Anion Gap 06/26/2022 11      Urea Nitrogen 06/26/2022 16      Creatinine 06/26/2022 0.79      Calcium 06/26/2022 8.5      Glucose 06/26/2022 151 (A)     GFR Estimate 06/26/2022 >90      Color Urine 06/26/2022 Dark Yellow (A)     Appearance Urine 06/26/2022 Clear      Glucose Urine 06/26/2022 Negative      Bilirubin Urine 06/26/2022 6.0 mg/dL (A)     Ketones Urine 06/26/2022 Negative      Specific Gravity Urine 06/26/2022 1.024      Blood Urine 06/26/2022 Negative      pH Urine 06/26/2022 6.0      Protein Albumin Urine 06/26/2022 30  (A)     Urobilinogen Urine 06/26/2022 4.0 (A)     Nitrite Urine 06/26/2022 Negative      Leukocyte Esterase Urine 06/26/2022 Negative      Mucus Urine 06/26/2022 Present (A)     RBC Urine 06/26/2022 2      WBC Urine 06/26/2022 4      Squamous Epithelials Uri* 06/26/2022 <1      Influenza A PCR 06/26/2022 Negative      Influenza B PCR 06/26/2022 Negative      RSV PCR 06/26/2022 Negative      SARS CoV2 PCR 06/26/2022 Positive (A)     WBC Count 06/26/2022 6.8      RBC Count 06/26/2022 4.24 (A)     Hemoglobin 06/26/2022 12.9 (A)     Hematocrit 06/26/2022 38.2 (A)     MCV 06/26/2022 90      MCH 06/26/2022 30.4      MCHC 06/26/2022 33.8      RDW 06/26/2022 13.9      Platelet Count 06/26/2022 118 (A)     % Neutrophils 06/26/2022 86      % Lymphocytes 06/26/2022 5      % Monocytes 06/26/2022 8      % Eosinophils 06/26/2022 0      % Basophils 06/26/2022 0      % Immature Granulocytes 06/26/2022 1      NRBCs per 100 WBC 06/26/2022 0      Absolute Neutrophils 06/26/2022 5.8      Absolute Lymphocytes 06/26/2022 0.4 (A)     Absolute Monocytes 06/26/2022 0.5      Absolute Eosinophils 06/26/2022 0.0      Absolute Basophils 06/26/2022 0.0      Absolute Immature Granul* 06/26/2022 0.0      Absolute NRBCs 06/26/2022 0.0      Bilirubin Total 06/26/2022 7.3 (A)     Bilirubin Direct 06/26/2022 4.3 (A)     Protein Total 06/26/2022 6.4      Albumin 06/26/2022 2.7 (A)     Alkaline  Phosphatase 06/26/2022 334 (A)     AST 06/26/2022 160 (A)     ALT 06/26/2022 228 (A)     Lipase 06/26/2022 130 (A)     Culture 06/26/2022 No growth after 12 hours      Culture 06/26/2022 No growth after 12 hours      Ventricular Rate 06/26/2022 60      Atrial Rate 06/26/2022 60      VT Interval 06/26/2022 148      QRS Duration 06/26/2022 82      QT 06/26/2022 432      QTc 06/26/2022 432      P Axis 06/26/2022 70      R AXIS 06/26/2022 32      T [place] 06/26/2022 47      Interpretation ECG 06/26/2022                      Value:Sinus rhythm  Normal ECG  When compared with ECG of 01-DEC-2020 19:25,  No significant change was found       Sodium 06/27/2022 141      Potassium 06/27/2022 3.3 (A)     Chloride 06/27/2022 111 (A)     Carbon Dioxide (CO2) 06/27/2022 23      Anion Gap 06/27/2022 7      Urea Nitrogen 06/27/2022 13      Creatinine 06/27/2022 0.71      Calcium 06/27/2022 8.0 (A)     Glucose 06/27/2022 105      Alkaline Phosphatase 06/27/2022 276 (A)     AST 06/27/2022 109 (A)     ALT 06/27/2022 175 (A)     Protein Total 06/27/2022 5.7 (A)     Albumin 06/27/2022 2.3 (A)     Bilirubin Total 06/27/2022 4.4 (A)     GFR Estimate 06/27/2022 >90      WBC Count 06/27/2022 4.3      RBC Count 06/27/2022 3.69 (A)     Hemoglobin 06/27/2022 11.4 (A)     Hematocrit 06/27/2022 33.4 (A)     MCV 06/27/2022 91      MCH 06/27/2022 30.9      MCHC 06/27/2022 34.1      RDW 06/27/2022 14.3      Platelet Count 06/27/2022 106 (A)         All imaging studies reviewed by me.

## 2022-06-27 NOTE — ANESTHESIA PROCEDURE NOTES
Airway       Patient location during procedure: OR       Procedure Start/Stop Times: 6/27/2022 4:20 PM and 6/27/2022 4:22 PM  Staff -        Anesthesiologist:  Gerry Garduno MD       CRNA: You Woods APRN CRNA       Performed By: NAHOMY  Consent for Airway        Urgency: elective  Indications and Patient Condition       Indications for airway management: kanchan-procedural       Induction type:intravenous       Mask difficulty assessment: 1 - vent by mask    Final Airway Details       Final airway type: endotracheal airway       Successful airway: ETT - single  Endotracheal Airway Details        ETT size (mm): 7.5       Successful intubation technique: direct laryngoscopy       DL Blade Type: Sepulveda 2       Grade View of Cords: 1       Adjucts: stylet and tooth guard       Position: Right       Measured from: lips       Secured at (cm): 23       Bite block used: None    Post intubation assessment        Placement verified by: capnometry, equal breath sounds and chest rise        Number of attempts at approach: 1       Number of other approaches attempted: 0       Secured with: silk tape       Ease of procedure: easy       Dentition: Intact and Unchanged    Medication(s) Administered   Medication Administration Time: 6/27/2022 4:20 PM    Additional Comments       Intubated by Shante Frank SRNA

## 2022-06-28 VITALS
RESPIRATION RATE: 18 BRPM | SYSTOLIC BLOOD PRESSURE: 134 MMHG | TEMPERATURE: 97.4 F | HEART RATE: 76 BPM | OXYGEN SATURATION: 93 % | DIASTOLIC BLOOD PRESSURE: 62 MMHG

## 2022-06-28 LAB
ALBUMIN SERPL-MCNC: 2.3 G/DL (ref 3.5–5)
ALP SERPL-CCNC: 260 U/L (ref 45–120)
ALT SERPL W P-5'-P-CCNC: 138 U/L (ref 0–45)
ANION GAP SERPL CALCULATED.3IONS-SCNC: 6 MMOL/L (ref 5–18)
AST SERPL W P-5'-P-CCNC: 57 U/L (ref 0–40)
BASOPHILS # BLD AUTO: 0 10E3/UL (ref 0–0.2)
BASOPHILS NFR BLD AUTO: 0 %
BILIRUB SERPL-MCNC: 2.6 MG/DL (ref 0–1)
BUN SERPL-MCNC: 8 MG/DL (ref 8–28)
CALCIUM SERPL-MCNC: 8.5 MG/DL (ref 8.5–10.5)
CHLORIDE BLD-SCNC: 110 MMOL/L (ref 98–107)
CO2 SERPL-SCNC: 25 MMOL/L (ref 22–31)
CREAT SERPL-MCNC: 0.7 MG/DL (ref 0.7–1.3)
EOSINOPHIL # BLD AUTO: 0 10E3/UL (ref 0–0.7)
EOSINOPHIL NFR BLD AUTO: 0 %
ERYTHROCYTE [DISTWIDTH] IN BLOOD BY AUTOMATED COUNT: 14.2 % (ref 10–15)
GFR SERPL CREATININE-BSD FRML MDRD: >90 ML/MIN/1.73M2
GLUCOSE BLD-MCNC: 148 MG/DL (ref 70–125)
HCT VFR BLD AUTO: 39.3 % (ref 40–53)
HGB BLD-MCNC: 12.5 G/DL (ref 13.3–17.7)
IMM GRANULOCYTES # BLD: 0 10E3/UL
IMM GRANULOCYTES NFR BLD: 1 %
LYMPHOCYTES # BLD AUTO: 0.5 10E3/UL (ref 0.8–5.3)
LYMPHOCYTES NFR BLD AUTO: 16 %
MCH RBC QN AUTO: 30.3 PG (ref 26.5–33)
MCHC RBC AUTO-ENTMCNC: 31.8 G/DL (ref 31.5–36.5)
MCV RBC AUTO: 95 FL (ref 78–100)
MONOCYTES # BLD AUTO: 0.2 10E3/UL (ref 0–1.3)
MONOCYTES NFR BLD AUTO: 8 %
NEUTROPHILS # BLD AUTO: 2.1 10E3/UL (ref 1.6–8.3)
NEUTROPHILS NFR BLD AUTO: 75 %
NRBC # BLD AUTO: 0 10E3/UL
NRBC BLD AUTO-RTO: 0 /100
PLATELET # BLD AUTO: 141 10E3/UL (ref 150–450)
POTASSIUM BLD-SCNC: 4.2 MMOL/L (ref 3.5–5)
PROT SERPL-MCNC: 6 G/DL (ref 6–8)
RBC # BLD AUTO: 4.12 10E6/UL (ref 4.4–5.9)
SODIUM SERPL-SCNC: 141 MMOL/L (ref 136–145)
WBC # BLD AUTO: 2.8 10E3/UL (ref 4–11)

## 2022-06-28 PROCEDURE — 36415 COLL VENOUS BLD VENIPUNCTURE: CPT | Performed by: INTERNAL MEDICINE

## 2022-06-28 PROCEDURE — 80053 COMPREHEN METABOLIC PANEL: CPT | Performed by: INTERNAL MEDICINE

## 2022-06-28 PROCEDURE — 250N000011 HC RX IP 250 OP 636: Performed by: INTERNAL MEDICINE

## 2022-06-28 PROCEDURE — 99239 HOSP IP/OBS DSCHRG MGMT >30: CPT | Performed by: INTERNAL MEDICINE

## 2022-06-28 PROCEDURE — 82040 ASSAY OF SERUM ALBUMIN: CPT | Performed by: INTERNAL MEDICINE

## 2022-06-28 PROCEDURE — 85025 COMPLETE CBC W/AUTO DIFF WBC: CPT | Performed by: INTERNAL MEDICINE

## 2022-06-28 RX ADMIN — PIPERACILLIN AND TAZOBACTAM 3.38 G: 3; .375 INJECTION, POWDER, LYOPHILIZED, FOR SOLUTION INTRAVENOUS at 05:56

## 2022-06-28 ASSESSMENT — ACTIVITIES OF DAILY LIVING (ADL)
DEPENDENT_IADLS:: TRANSPORTATION
ADLS_ACUITY_SCORE: 35

## 2022-06-28 NOTE — PLAN OF CARE
Problem: Plan of Care - These are the overarching goals to be used throughout the patient stay.    Goal: Optimal Comfort and Wellbeing  Outcome: Adequate for Care Transition  Intervention: Monitor Pain and Promote Comfort  Recent Flowsheet Documentation  Taken 6/28/2022 0746 by Lou Ingram RN  Pain Management Interventions: declines  Pt has denied pain all this shift.    Problem: Plan of Care - These are the overarching goals to be used throughout the patient stay.    Goal: Readiness for Transition of Care  Outcome: Adequate for Care Transition   Pt tolerating reg diet, up moving around room ind. LFTs trending down.    Goal Outcome Evaluation: ERCP

## 2022-06-28 NOTE — PROGRESS NOTES
Pt transferred back to [address] from [facility] for bili stent placement. Denies pain. IVF and zosyn initiated. Independent in room. No complaints offered overnight. VSS. Hopeful to discharge home this morning.

## 2022-06-28 NOTE — PROGRESS NOTES
Care Management Discharge Note    Discharge Date: 06/28/2022       Discharge Disposition: Home    Discharge Services: None    Discharge DME: None    Discharge Transportation:      Private pay costs discussed: Not applicable    PAS Confirmation Code:  (N/A)  Patient/family educated on Medicare website which has current facility and service quality ratings: no    Education Provided on the Discharge Plan:  No   Persons Notified of Discharge Plans: Pt   Patient/Family in Agreement with the Plan: yes    Handoff Referral Completed: N/A    Additional Information:  Chart reviewed. Pt lives with wife. No CM needs identified. Family to transport.     FREEDOM Cutler

## 2022-06-28 NOTE — ANESTHESIA POSTPROCEDURE EVALUATION
Patient: You Montano    Procedure: Procedure(s):  ENDOSCOPIC RETROGRADE CHOLANGIOPANCREATOGRAPHY, BILIARY SPHINCTEROTOMY, STONE EXTRACTION, SCLEROTHERAPY WITH EPI FOR BLEEDING, BILIARY STENT PLACEMENT       Anesthesia Type:  General    Note:     Postop Pain Control: Uneventful            Sign Out: Well controlled pain   PONV: No   Neuro/Psych: Uneventful            Sign Out: Acceptable/Baseline neuro status   Airway/Respiratory: Uneventful            Sign Out: Acceptable/Baseline resp. status   CV/Hemodynamics: Uneventful            Sign Out: Acceptable CV status; No obvious hypovolemia; No obvious fluid overload   Other NRE: NONE   DID A NON-ROUTINE EVENT OCCUR? No           Last vitals:  Vitals Value Taken Time   /72 06/27/22 1800   Temp 37  C (98.6  F) 06/27/22 1800   Pulse 66 06/27/22 1800   Resp 16 06/27/22 1800   SpO2 97 % 06/27/22 1800       Electronically Signed By: Gerry Garduno MD  June 27, 2022  8:36 PM

## 2022-06-28 NOTE — PROVIDER NOTIFICATION
10:13 PM Spoke with Dr Scooter Wilcox in regards to patient's requests to restart his home clonazepam 2mg at bedtime and to have a diet.    TORB for low fat diet and clonazepam 2mg at bedtime

## 2022-06-28 NOTE — PROGRESS NOTES
[facility] Progress Note       SUBJECTIVE:  Patient denies abdominal pain, nausea/vomiting. Tolerating low fat diet. Still coughing but thinks it's from COPD and not covid.    Reviewed ERCP results with patient.      OBJECTIVE:  /62 (BP Location: Left arm, Patient Position: Sitting)   Pulse 76   Temp 97.4  F (36.3  C) (Oral)   Resp 18   SpO2 93%   Temp (24hrs), Av  F (36.7  C), Min:97.3  F (36.3  C), Max:99.5  F (37.5  C)    No data found.    Intake/Output Summary (Last 24 hours) at 2022 0945  Last data filed at 2022 2121  Gross per 24 hour   Intake 240 ml   Output --   Net 240 ml        PHYSICAL EXAM  GEN: NAD, older male appears stated age sitting up in bed  HRT: no LE edema  RESP: unlabored  ABD: soft, nontender   SKIN: No rash or jaundice      Additional Data:  I have reviewed the patient's new clinical lab results:     Recent Labs   Lab Test 22  0649 22  0803 22  1048 20  0606 20  0559 20  1803 20  0549   WBC 2.8* 4.3 6.8  --   --  9.4 6.9   HGB 12.5* 11.4* 12.9*   < > 11.1* 13.7* 14.2   MCV 95 91 90  --   --  95 94   * 106* 118*  --   --  143 140   INR  --   --   --   --  1.14* 1.06 1.07    < > = values in this interval not displayed.     Recent Labs   Lab Test 22  0649 22  0803 22  1048   POTASSIUM 4.2 3.3* 3.3*   CHLORIDE 110* 111* 104   CO2 25 23 21*   BUN 8 13 16   ANIONGAP 6 7 11     Recent Labs   Lab Test 22  0649 22  0803 22  1836 22  1236 22  1048   ALBUMIN 2.3* 2.3*  --   --  2.7*   BILITOTAL 2.6* 4.4*  --   --  7.3*   * 175*  --   --  228*   AST 57* 109*  --   --  160*   PROTEIN  --   --   --  30 *  --    LIPASE  --   --  130*  --   --      SARS CoV2 PCR 22: positive     Imaging results:  RUQ ultrasound 22:  FINDINGS:  GALLBLADDER: There is gallbladder wall edema with internal sludge and questionable punctate stones. No pathologic luminal distention.  Questionable trace pericholecystic fluid.  BILE DUCTS: There is intra and extrahepatic biliary ductal dilation. The common duct measures 10 mm. No choledocholithiasis in the visualized portions of the common duct.  LIVER: Likely hepatic steatosis with smooth contour. No focal mass. Simple appearing right hepatic cyst, no specific follow-up recommended.  RIGHT KIDNEY: No hydronephrosis.  PANCREAS: The visualized portions are normal.  No ascites.                                                                   IMPRESSION:  1.  Findings suspicious for biliary obstruction. No choledocholithiasis or other etiology for obstruction in the visualized portions of the common duct.  2.  Gallbladder wall edema with likely internal sludge and questionable trace pericholecystic fluid. Favor findings are secondary to #1 given lack of pathologic luminal distention.  3.  Likely hepatic steatosis.     CXR 6/26/22:  IMPRESSION: Heart size and vascularity are normal. Calcified bilateral granulomata redemonstrated. Shallow inspiration accentuates bibasilar subsegmental atelectasis/scar. No focal consolidation, pneumothorax nor pleural effusion.    Procedure results:  ERCP 6/27/22 (Go):  Findings:        The  film was normal. The esophagus was successfully intubated        under direct vision. The scope was advanced to a normal major papilla in        the descending duodenum without detailed examination of the pharynx,        larynx and associated structures, and upper GI tract. The upper GI tract        was grossly normal. A 0.035 inch x 260 cm straight Dreamwire was passed        into the biliary tree. The Autotome sphincterotome was passed over the        guidewire and the bile duct was then deeply cannulated. Contrast was        injected. I personally interpreted the bile duct images. There was brisk        flow of contrast through the ducts. Image quality was excellent.        Contrast extended to the hepatic ducts. The common  bile duct was        moderately dilated. The largest diameter was 10 mm. An 8 mm biliary        sphincterotomy was made with a Autotome sphincterotome. Minor bleeding        from the sphincterotomy was successfully treated with inection of 2 cc        of epi/saline. To discover objects, the biliary tree was swept with an        11.5 mm balloon starting at the bifurcation. Sludge was swept from the        duct. Three stones were removed. No stones remained. One 10 Fr by 5 cm        plastic stent with a single external flap and a single internal flap was        placed into the common bile duct. Bile flowed through the stent. The        stent was in good position.                                                                                   Impression:  - The common bile duct was moderately dilated.                          - Choledocholithiasis was found. Complete removal was                          accomplished by biliary sphincterotomy and balloon                          extraction.                          - A biliary sphincterotomy was performed.                          - The biliary tree was swept.                          - One plastic stent was placed into the common bile                          duct.   Recommendation:  - Return patient to referring hospital for ongoing care.                          - Observe patient's clinical course following today's                          ERCP with therapeutic intervention.                          - Avoid aspirin and nonsteroidal anti-inflammatory                          medicines for 2 weeks.                          - Repeat ERCP in 1-2 months to remove stent, before or                          after cholecystectomy.                          - Elective cholecystectomy        IMPRESSION:  Choledocholithiasis  This is a 72 y/o male with PMH COPD, macular degeneration, GERD admitted 6/26 for cough due to covid-19 and suspected biliary obstruction after presenting  with cough and fever x 5 days. Liver tests found to be elevated and US shows cholelithiasis with CBD dilation without obvious choledocholithiasis. WBC in normal range, no fevers or abdominal pain, started on abx 6/27. He was transferred to [facility] for ERCP 6/27 with removal of CBD stones and placement of common bile duct stent, which will be removed with ERCP in 1-2 months. Liver tests improved today. He is tolerating low fat diet without any pain/symptoms. Surgery consulted 6/27 and patient declined cholecystectomy. He was made aware that he can have recurrence of choledocholithiasis without removal of gallbladder and understands the risk.     PLAN:  - Diet as tolerated  - Okay to discharge today per GI  - Okay to stop abx  - Repeat liver tests at PCP in 2 weeks  - Follow up ERCP in 1-2 months for stent removal ([facility] will contact pt to arrange)      (Dr. Colbert)  Leti Tuttle PA-C  [facility]  6/28/2022 9:45 AM  [phone] (office)  ________________________________________________________________________

## 2022-06-28 NOTE — DISCHARGE SUMMARY
[facility] MEDICINE  DISCHARGE SUMMARY     Primary Care Physician: Vincenzo Malik  Admission Date: 6/27/2022   Discharge Provider: Mackenzie Marcial MD Discharge Date: 6/28/2022   Diet:   Active Diet and Nourishment Order   Procedures     Low Fat Diet     Diet       Code Status: Full Code   Activity: DCACTIVITY: Activity as tolerated        Condition at Discharge: Good     REASON FOR PRESENTATION(See Admission Note for Details)   Abdominal pain, choledocholithiasis    PRINCIPAL & ACTIVE DISCHARGE DIAGNOSES     Active Problems:    Choledocholithiasis  COPD  Asymptomatic COVID-19  Dyslipidemia  Hypokalemia  Mild hypocalcemia  Hypoalbuminemia  Mild anemia  Thrombocytopenia  Overweight  PENDING LABS     Unresulted Labs Ordered in the Past 30 Days of this Admission     Date and Time Order Name Status Description    6/26/2022  5:45 PM Blood Culture Peripheral Blood Preliminary     6/26/2022  5:45 PM Blood Culture Peripheral Blood Preliminary             PROCEDURES ( this hospitalization only)          RECOMMENDATIONS TO OUTPATIENT PROVIDER FOR F/U VISIT     Follow-up Appointments     Follow-up and recommended labs and tests       Follow up with primary care provider, Vincenzo Malik, in 2 weeks , for   hospital follow- up. The following labs/tests are recommended: LFTs    [facility] will contact to schedule a repeat ERCP to remove stent .                 DISPOSITION     Home    SUMMARY OF HOSPITAL COURSE:      71-year-old gentleman with COPD, dyslipidemia presented with abdominal pain, ultrasound showed biliary ductal dilation, LFTs were elevated, seen by GI underwent ERCP, sphincterotomy, extraction of the stone and plastic stent placement that will need removal in 4 to 8 weeks, [facility] to contact.  There was a questionable findings of pericholecystic fluid, no right upper quadrant tenderness at the time of discharge, no leukocytosis, has been afebrile.  Antibiotics have been discontinued.   Patient declined cholecystectomy.,  Aware of the risk of recurrent choledocholithiasis.  Thrombocytopenia, improved  Blood cultures negative  LFTs improved  Repeat ERCP in 1 to 2 months for stent removal    COVID-19  Asymptomatic, no hypoxia  No treatment indicated, elevated LFTs, no Remdesivir/Paxlovid      Discharge Medications with Med changes:     Current Discharge Medication List      CONTINUE these medications which have NOT CHANGED    Details   acetaminophen (TYLENOL) 325 MG tablet Take 975 mg by mouth every 8 hours as needed for mild pain      albuterol (PROAIR HFA;PROVENTIL HFA;VENTOLIN HFA) 90 mcg/actuation inhaler [ALBUTEROL (PROAIR HFA;PROVENTIL HFA;VENTOLIN HFA) 90 MCG/ACTUATION INHALER] Inhale 2 puffs every 6 (six) hours as needed for wheezing.    Comments: May substitute the equivalent medication per insurance preference.      atorvastatin (LIPITOR) 10 MG tablet [ATORVASTATIN (LIPITOR) 10 MG TABLET] Take 1 tablet (10 mg total) by mouth daily.  Qty: 90 tablet, Refills: 3    Associated Diagnoses: Coronary artery calcification; Hyperlipidemia LDL goal <70      budesonide-formoteroL (SYMBICORT) 80-4.5 mcg/actuation inhaler [BUDESONIDE-FORMOTEROL (SYMBICORT) 80-4.5 MCG/ACTUATION INHALER] Inhale 2 puffs 2 (two) times a day.  Qty: 1 Inhaler, Refills: 5    Associated Diagnoses: Chronic obstructive pulmonary disease, unspecified COPD type (H)      clonazePAM (KLONOPIN) 2 MG tablet [CLONAZEPAM (KLONOPIN) 2 MG TABLET] Take 2 mg by mouth at bedtime.       famotidine (PEPCID) 40 MG tablet TAKE 1 TABLET BY MOUTH DAILY.  Qty: 90 tablet, Refills: 0    Associated Diagnoses: GERD (gastroesophageal reflux disease)      ipratropium - albuterol 0.5 mg/2.5 mg/3 mL (DUONEB) 0.5-2.5 (3) MG/3ML neb solution INHALE THE CONTENTS OF ONE VIAL VIA NEBULIZER EVERY 6 HOURS AS NEEDED  Qty: 180 mL, Refills: 0    Associated Diagnoses: Chronic obstructive pulmonary disease, unspecified COPD type (H)      Melatonin 10 MG TABS tablet Take  10 mg by mouth At Bedtime      Multiple Vitamins-Minerals (ICAPS AREDS FORMULA PO) Take 1 capsule by mouth 2 times daily      sildenafil (REVATIO) 20 mg tablet [SILDENAFIL (REVATIO) 20 MG TABLET] Take 2-3 tabs PO 1 hr before sex  Qty: 30 tablet, Refills: 3    Associated Diagnoses: Low libido; Erectile dysfunction due to arterial insufficiency                   Rationale for medication changes:              Consults       None    Immunizations given this encounter     Most Recent Immunizations   Administered Date(s) Administered     Pneumo Conj 13-V (2010&after) 08/05/2015     Pneumococcal 23 valent 08/06/2019     Td (Adult), Adsorbed 09/24/2004     Tdap (Adacel,Boostrix) 02/28/2012           Anticoagulation Information      Recent INR results: No results for input(s): INR in the last 168 hours.  Warfarin doses (if applicable) or name of other anticoagulant:       SIGNIFICANT IMAGING FINDINGS     No results found for this visit on 06/27/22.    SIGNIFICANT LABORATORY FINDINGS     Most Recent 3 CBC's:Recent Labs   Lab Test 06/28/22  0649 06/27/22  0803 06/26/22  1048   WBC 2.8* 4.3 6.8   HGB 12.5* 11.4* 12.9*   MCV 95 91 90   * 106* 118*     Most Recent 3 BMP's:Recent Labs   Lab Test 06/28/22  0649 06/27/22  0803 06/26/22  1048    141 136   POTASSIUM 4.2 3.3* 3.3*   CHLORIDE 110* 111* 104   CO2 25 23 21*   BUN 8 13 16   CR 0.70 0.71 0.79   ANIONGAP 6 7 11   SIMON 8.5 8.0* 8.5   * 105 151*     Most Recent 2 LFT's:Recent Labs   Lab Test 06/28/22  0649 06/27/22  0803   AST 57* 109*   * 175*   ALKPHOS 260* 276*   BILITOTAL 2.6* 4.4*           Discharge Orders        COVID-19 Get[facility] Loop Referral      Reason for your hospital stay    Abdominal pain, stone in bile duct that was removed. Stent was placed and that needs to be removed in 4-8 week  Do Not Take Aspirin or ibuprofen or aleve in next 2 weeks.     Contact provider    Contact your primary care provider if If increased trouble breathing, new  arm/leg swelling, dizziness/passing out, falls, bleeding that doesn't stop, or uncontrolled pain.     Follow-up and recommended labs and tests     Follow up with primary care provider, Vincenzo Malik, in 2 weeks , for hospital follow- up. The following labs/tests are recommended: LFTs    [facility] will contact to schedule a repeat ERCP to remove stent .     Discharge - Quarantine/Isolation Instruction    Date of symptom onset:     Date of first positive test:    If you tested positive COVID-19 and show symptoms (fever, cough, body aches or trouble breathing):        Stay home and away from others (self-isolate) until:        At least 5 days have passed since your symptoms started or you tested positive. AND...        You've had no fever-and no medicine that reduces fever for 1 full day (24 hours). AND...         Your other symptoms have resolved (gotten better).  Strict mask use when around others from day 6-10 after your positive test.     When to contact your care team    Call your primary doctor if you have any of the following: temperature greater than 101F  or  increased shortness of breath.  Call [facility]  if you have any of the following: Abdominal Pain, Jaundice. .     Diet    Follow this diet upon discharge: Orders Placed This Encounter      Low Fat Diet       Examination   Physical Exam   Temp:  [97.3  F (36.3  C)-99.5  F (37.5  C)] 97.4  F (36.3  C)  Pulse:  [66-76] 76  Resp:  [14-18] 18  BP: (134-161)/(62-73) 134/62  SpO2:  [92 %-100 %] 93 %  Wt Readings from Last 1 Encounters:   06/26/22 70.3 kg (155 lb)       General: Awake alert and comfortable  Lungs: CTA without rales or rhonchi  Heart: S1, S2 without murmurs  Abdomen: Soft nontender, bowel sounds positive  CNS: Nonfocal  Extremities: No edema        Please see EMR for more detailed significant labs, imaging, consultant notes etc.    Mackenzie MAC MD, personally saw the patient today and spent greater than 30 minutes discharging this  patient.    Mackenzie Marcial MD  [facility]    CC:Vincenzo Malik

## 2022-06-29 ENCOUNTER — PATIENT OUTREACH (OUTPATIENT)
Dept: CARE COORDINATION | Facility: CLINIC | Age: 72
End: 2022-06-29

## 2022-06-29 DIAGNOSIS — Z71.89 OTHER SPECIFIED COUNSELING: ICD-10-CM

## 2022-06-29 NOTE — PROGRESS NOTES
Clinic Care Coordination Contact  [facility]/Voicemail       Clinical Data: Care Coordinator Outreach  Outreach attempted x 1.  Left message on patient's voicemail with call back information and requested return call.  Plan: Care Coordinator will try to reach patient again in 1-2 business days.        NONA Perez  [phone]  [facility]

## 2022-06-30 NOTE — PROGRESS NOTES
Clinic Care Coordination Contact  [facility]/Voicemail       Clinical Data: Care Coordinator Outreach  Outreach attempted x 2.  Left message on patient's voicemail with call back information and requested return call.  Plan: Care Coordinator will do no further outreaches at this time.        NONA Perez  [phone]  [facility]

## 2022-07-01 LAB
BACTERIA BLD CULT: NO GROWTH
BACTERIA BLD CULT: NO GROWTH

## 2022-07-07 DIAGNOSIS — J44.9 CHRONIC OBSTRUCTIVE PULMONARY DISEASE, UNSPECIFIED COPD TYPE (H): ICD-10-CM

## 2022-07-08 RX ORDER — IPRATROPIUM BROMIDE AND ALBUTEROL SULFATE 2.5; .5 MG/3ML; MG/3ML
SOLUTION RESPIRATORY (INHALATION)
Qty: 180 ML | Refills: 0 | OUTPATIENT
Start: 2022-07-08

## 2023-10-20 DIAGNOSIS — J44.9 CHRONIC OBSTRUCTIVE PULMONARY DISEASE, UNSPECIFIED COPD TYPE (H): ICD-10-CM

## 2023-10-20 RX ORDER — DILTIAZEM HYDROCHLORIDE 60 MG/1
2 TABLET, FILM COATED ORAL 2 TIMES DAILY
Qty: 10.2 G | Refills: 1 | Status: SHIPPED | OUTPATIENT
Start: 2023-10-20 | End: 2023-11-29

## 2023-10-26 NOTE — TELEPHONE ENCOUNTER
Ok for home care orders   Cosentyx Counseling:  I discussed with the patient the risks of Cosentyx including but not limited to worsening of Crohn's disease, immunosuppression, allergic reactions and infections.  The patient understands that monitoring is required including a PPD at baseline and must alert us or the primary physician if symptoms of infection or other concerning signs are noted.

## 2023-11-09 DIAGNOSIS — Z12.11 COLON CANCER SCREENING: ICD-10-CM

## 2023-11-23 ENCOUNTER — LAB (OUTPATIENT)
Dept: FAMILY MEDICINE | Facility: CLINIC | Age: 73
End: 2023-11-23
Payer: COMMERCIAL

## 2023-11-23 DIAGNOSIS — Z12.11 COLON CANCER SCREENING: ICD-10-CM

## 2023-11-28 DIAGNOSIS — J44.9 CHRONIC OBSTRUCTIVE PULMONARY DISEASE, UNSPECIFIED COPD TYPE (H): ICD-10-CM

## 2023-11-29 RX ORDER — BUDESONIDE AND FORMOTEROL FUMARATE DIHYDRATE 80; 4.5 UG/1; UG/1
2 AEROSOL RESPIRATORY (INHALATION) 2 TIMES DAILY
Qty: 30.6 G | Refills: 3 | Status: SHIPPED | OUTPATIENT
Start: 2023-11-29

## (undated) DEVICE — PLATE GROUNDING ADULT W/CORD 9165L

## (undated) DEVICE — TUBING SUCTION MEDI-VAC 1/4"X20' N620A - HE

## (undated) DEVICE — WIRE GUIDE 0.035"X260CM DREAMWIRE M00556100

## (undated) DEVICE — NDL SCLEROTHERAPY 25GA CARR-LOCK  00711811

## (undated) DEVICE — BALLOON EXTRACTION 15X1950MM 3.2MM TL B-V243Q-A

## (undated) DEVICE — SOL WATER IRRIG 1000ML BOTTLE 2F7114

## (undated) DEVICE — AUTOTOME 44 20MM SHORT WIRE SYSTEM M00545170

## (undated) DEVICE — SUCTION MANIFOLD NEPTUNE 2 SYS 1 PORT 702-025-000

## (undated) RX ORDER — LIDOCAINE HYDROCHLORIDE 10 MG/ML
INJECTION, SOLUTION EPIDURAL; INFILTRATION; INTRACAUDAL; PERINEURAL
Status: DISPENSED
Start: 2022-06-27

## (undated) RX ORDER — PROPOFOL 10 MG/ML
INJECTION, EMULSION INTRAVENOUS
Status: DISPENSED
Start: 2022-06-27

## (undated) RX ORDER — FENTANYL CITRATE 50 UG/ML
INJECTION, SOLUTION INTRAMUSCULAR; INTRAVENOUS
Status: DISPENSED
Start: 2022-06-27

## (undated) RX ORDER — DEXAMETHASONE SODIUM PHOSPHATE 10 MG/ML
INJECTION INTRAMUSCULAR; INTRAVENOUS
Status: DISPENSED
Start: 2022-06-27

## (undated) RX ORDER — ONDANSETRON 2 MG/ML
INJECTION INTRAMUSCULAR; INTRAVENOUS
Status: DISPENSED
Start: 2022-06-27